# Patient Record
Sex: MALE | Race: WHITE | Employment: OTHER | URBAN - METROPOLITAN AREA
[De-identification: names, ages, dates, MRNs, and addresses within clinical notes are randomized per-mention and may not be internally consistent; named-entity substitution may affect disease eponyms.]

---

## 2018-12-21 ENCOUNTER — HOSPITAL ENCOUNTER (OUTPATIENT)
Dept: CARDIOLOGY | Age: 73
Discharge: HOME | End: 2018-12-21
Payer: MEDICARE

## 2018-12-21 VITALS
HEART RATE: 69 BPM | WEIGHT: 135 LBS | SYSTOLIC BLOOD PRESSURE: 158 MMHG | BODY MASS INDEX: 20.46 KG/M2 | HEIGHT: 68 IN | DIASTOLIC BLOOD PRESSURE: 80 MMHG

## 2018-12-21 DIAGNOSIS — R07.9 CHEST PAIN, UNSPECIFIED TYPE: Primary | ICD-10-CM

## 2018-12-21 LAB
LV EF: 76 %
LVEF MODALITY: NORMAL

## 2018-12-21 PROCEDURE — 3430000000 HC RX DIAGNOSTIC RADIOPHARMACEUTICAL: Performed by: INTERNAL MEDICINE

## 2018-12-21 PROCEDURE — 2580000003 HC RX 258: Performed by: INTERNAL MEDICINE

## 2018-12-21 PROCEDURE — 93017 CV STRESS TEST TRACING ONLY: CPT

## 2018-12-21 PROCEDURE — A9500 TC99M SESTAMIBI: HCPCS | Performed by: INTERNAL MEDICINE

## 2018-12-21 PROCEDURE — 78452 HT MUSCLE IMAGE SPECT MULT: CPT

## 2018-12-21 RX ORDER — SODIUM CHLORIDE 0.9 % (FLUSH) 0.9 %
10 SYRINGE (ML) INJECTION PRN
Status: DISCONTINUED | OUTPATIENT
Start: 2018-12-21 | End: 2018-12-22 | Stop reason: HOSPADM

## 2018-12-21 RX ORDER — VITAMIN B COMPLEX
1 CAPSULE ORAL EVERY OTHER DAY
COMMUNITY
End: 2022-01-18

## 2018-12-21 RX ADMIN — Medication 10.7 MILLICURIE: at 08:08

## 2018-12-21 RX ADMIN — Medication 32.9 MILLICURIE: at 09:22

## 2018-12-21 RX ADMIN — Medication 10 ML: at 08:08

## 2018-12-21 RX ADMIN — Medication 10 ML: at 09:22

## 2019-03-07 ENCOUNTER — HOSPITAL ENCOUNTER (OUTPATIENT)
Dept: GENERAL RADIOLOGY | Age: 74
Discharge: HOME | End: 2019-03-09
Payer: MEDICARE

## 2019-03-07 ENCOUNTER — HOSPITAL ENCOUNTER (OUTPATIENT)
Age: 74
Discharge: HOME | End: 2019-03-09
Payer: MEDICARE

## 2019-03-07 DIAGNOSIS — R05.9 COUGH: ICD-10-CM

## 2019-03-07 PROCEDURE — 71046 X-RAY EXAM CHEST 2 VIEWS: CPT

## 2019-12-18 ENCOUNTER — HOSPITAL ENCOUNTER (OUTPATIENT)
Age: 74
Discharge: HOME | End: 2019-12-20
Payer: MEDICARE

## 2019-12-18 ENCOUNTER — HOSPITAL ENCOUNTER (OUTPATIENT)
Dept: GENERAL RADIOLOGY | Age: 74
Discharge: HOME | End: 2019-12-20
Payer: MEDICARE

## 2019-12-18 DIAGNOSIS — R07.81 PLEURODYNIA: ICD-10-CM

## 2019-12-18 DIAGNOSIS — R07.9 CHEST PAIN, UNSPECIFIED TYPE: ICD-10-CM

## 2019-12-18 PROCEDURE — 71100 X-RAY EXAM RIBS UNI 2 VIEWS: CPT

## 2019-12-18 PROCEDURE — 71046 X-RAY EXAM CHEST 2 VIEWS: CPT

## 2020-06-26 ENCOUNTER — HOSPITAL ENCOUNTER (OUTPATIENT)
Dept: GENERAL RADIOLOGY | Age: 75
Discharge: HOME | End: 2020-06-28
Payer: MEDICARE

## 2020-06-26 ENCOUNTER — HOSPITAL ENCOUNTER (OUTPATIENT)
Age: 75
Discharge: HOME | End: 2020-06-28
Payer: MEDICARE

## 2020-06-26 PROCEDURE — 73630 X-RAY EXAM OF FOOT: CPT

## 2020-07-13 ENCOUNTER — HOSPITAL ENCOUNTER (OUTPATIENT)
Dept: GENERAL RADIOLOGY | Age: 75
Discharge: HOME | End: 2020-07-15
Payer: MEDICARE

## 2020-07-13 ENCOUNTER — HOSPITAL ENCOUNTER (OUTPATIENT)
Age: 75
Discharge: HOME | End: 2020-07-15
Payer: MEDICARE

## 2020-07-13 PROCEDURE — 73630 X-RAY EXAM OF FOOT: CPT

## 2020-09-29 ENCOUNTER — OFFICE VISIT (OUTPATIENT)
Dept: ORTHOPEDIC SURGERY | Age: 75
End: 2020-09-29
Payer: MEDICARE

## 2020-09-29 VITALS — BODY MASS INDEX: 19.7 KG/M2 | TEMPERATURE: 97.2 F | WEIGHT: 130 LBS | HEIGHT: 68 IN

## 2020-09-29 PROCEDURE — 4040F PNEUMOC VAC/ADMIN/RCVD: CPT | Performed by: ORTHOPAEDIC SURGERY

## 2020-09-29 PROCEDURE — G8427 DOCREV CUR MEDS BY ELIG CLIN: HCPCS | Performed by: ORTHOPAEDIC SURGERY

## 2020-09-29 PROCEDURE — 1123F ACP DISCUSS/DSCN MKR DOCD: CPT | Performed by: ORTHOPAEDIC SURGERY

## 2020-09-29 PROCEDURE — 99202 OFFICE O/P NEW SF 15 MIN: CPT | Performed by: ORTHOPAEDIC SURGERY

## 2020-09-29 PROCEDURE — 3017F COLORECTAL CA SCREEN DOC REV: CPT | Performed by: ORTHOPAEDIC SURGERY

## 2020-09-29 PROCEDURE — G8420 CALC BMI NORM PARAMETERS: HCPCS | Performed by: ORTHOPAEDIC SURGERY

## 2020-09-29 PROCEDURE — 1036F TOBACCO NON-USER: CPT | Performed by: ORTHOPAEDIC SURGERY

## 2020-09-29 NOTE — PROGRESS NOTES
Chief Complaint:   Chief Complaint   Patient presents with    Foot Injury     Pain top of left foot off and on x 3 months. Injured stepping on a pickle ball 6/25/2020. Xrays taken at [address] [facility] showing fracture base of left 5th metatarsal.  Had been wearing a post op shoe until two weeks ago. ASHLEIGH Rivera is a 77 y.o. male, who presents with left foot pain episodically for the past few months, initial onset back in July following a twisting injury while he was playing pickle ball, pain at that time was limiting activities including weightbearing, he had x-rays showing a tiny avulsion chip fracture off the base of the fifth metatarsal, he managed it with a wooden postop shoe pain got better so he canceled his visit here, pain then recurred with activities but has now subsided. He has been playing some sports with mild but not limiting discomfort. No previous problems with this foot no other joint complaints. Taking nothing for pain at this time. Allergies; medications; past medical, surgical, family, and social history; and problem list have been reviewed today and updated as indicated in this encounter - see below following Ortho specifics. Musculoskeletal: Healthy-appearing well-developed 77 old male, upper extremities grossly intact, leg lengths equal hip motion painless, knees are straight and stable without laxity deformity or effusion. Right foot unremarkable, left foot shows minimal swelling and mild tenderness to palpation at the very proximal base of the left fifth metatarsal, there is no erythema or effusion, ankle and malleolar structures are completely normal no effusion or tenderness. Radiologic Studies: Initial and follow-up x-rays are reviewed in epic, again noting a tiny evulsion fracture at the base of the fifth metatarsal nondisplaced no evident involvement of the metaphysis or diaphysis.     ASSESSMENT/PLAN:    Iris Dalton was seen today for foot injury. Diagnoses and all orders for this visit:    Pain in joint, foot, left    Closed displaced fracture of fifth metatarsal bone of left foot, initial encounter       Patient was reassured and should expect further resolution of symptoms, he may moderate his activities to tolerance in the meantime, questions asked and answered follow-up as needed. Return if symptoms worsen or fail to improve. Katelynn Garcia MD    2020  3:49 PM      There is no problem list on file for this patient. Past Medical History:   Diagnosis Date    Cancer [facility])     skin areas benign       Past Surgical History:   Procedure Laterality Date    TONSILLECTOMY         Current Outpatient Medications   Medication Sig Dispense Refill    vitamin D (CHOLECALCIFEROL) 1000 UNIT TABS tablet Take 1,000 Units by mouth every other day       b complex vitamins capsule Take 1 capsule by mouth every other day        No current facility-administered medications for this visit. No Known Allergies    Social History     Socioeconomic History    Marital status:      Spouse name: None    Number of children: None    Years of education: None    Highest education level: None   Occupational History    None   Social Needs    Financial resource strain: None    Food insecurity     Worry: None     Inability: None    Transportation needs     Medical: None     Non-medical: None   Tobacco Use    Smoking status: Former Smoker     Types: Cigarettes     Last attempt to quit: 1967     Years since quittin.8    Smokeless tobacco: Never Used   Substance and Sexual Activity    Alcohol use:  Yes     Alcohol/week: 2.0 standard drinks     Types: 2 Cans of beer per week     Comment: weekly     Drug use: Never    Sexual activity: None   Lifestyle    Physical activity     Days per week: None     Minutes per session: None    Stress: None   Relationships    Social connections     Talks on phone: None     Gets together: None     Attends Tenriism service: None     Active member of club or organization: None     Attends meetings of clubs or organizations: None     Relationship status: None    Intimate partner violence     Fear of current or ex partner: None     Emotionally abused: None     Physically abused: None     Forced sexual activity: None   Other Topics Concern    None   Social History Narrative    None       Family History   Problem Relation Age of Onset    Other Mother         old age   Guru Navarrete Heart Attack Father 61    Other Sister         thyroid         Review of Systems  As follows except as previously noted in HPI:  Constitutional: Negative for chills, diaphoresis, fatigue, fever and unexpected weight change. Respiratory: Negative for cough, shortness of breath and wheezing. Cardiovascular: Negative for chest pain and palpitations. Neurological: Negative for dizziness, syncope, cephalgia. GI / : negative  Musculoskeletal: see HPI       Objective:   Physical Exam   Constitutional: Oriented to person, place, and time. and appears well-developed and well-nourished. :   Head: Normocephalic and atraumatic. Eyes: EOM are normal.   Neck: Neck supple. Cardiovascular: Normal rate and regular rhythm. Pulmonary/Chest: Effort normal. No stridor. No respiratory distress, no wheezes. Abdominal:  No abnormal distension. Neurological: Alert and oriented to person, place, and time. Skin: Skin is warm and dry. Psychiatric: Normal mood and affect.  Behavior is normal. Thought content normal.

## 2021-10-15 ENCOUNTER — APPOINTMENT (OUTPATIENT)
Dept: GENERAL RADIOLOGY | Age: 76
DRG: 310 | End: 2021-10-15
Payer: MEDICARE

## 2021-10-15 ENCOUNTER — APPOINTMENT (OUTPATIENT)
Dept: NON INVASIVE DIAGNOSTICS | Age: 76
DRG: 310 | End: 2021-10-15
Payer: MEDICARE

## 2021-10-15 ENCOUNTER — ANESTHESIA EVENT (OUTPATIENT)
Dept: NON INVASIVE DIAGNOSTICS | Age: 76
DRG: 310 | End: 2021-10-15
Payer: MEDICARE

## 2021-10-15 ENCOUNTER — ANESTHESIA (OUTPATIENT)
Dept: NON INVASIVE DIAGNOSTICS | Age: 76
DRG: 310 | End: 2021-10-15
Payer: MEDICARE

## 2021-10-15 ENCOUNTER — HOSPITAL ENCOUNTER (INPATIENT)
Age: 76
LOS: 1 days | Discharge: HOME | DRG: 310 | End: 2021-10-16
Attending: EMERGENCY MEDICINE | Admitting: INTERNAL MEDICINE
Payer: MEDICARE

## 2021-10-15 VITALS
OXYGEN SATURATION: 100 % | DIASTOLIC BLOOD PRESSURE: 71 MMHG | SYSTOLIC BLOOD PRESSURE: 114 MMHG | RESPIRATION RATE: 17 BRPM

## 2021-10-15 DIAGNOSIS — R00.0 WIDE-COMPLEX TACHYCARDIA: Primary | ICD-10-CM

## 2021-10-15 DIAGNOSIS — Z86.79 HISTORY OF WOLFF-PARKINSON-WHITE (WPW) SYNDROME: ICD-10-CM

## 2021-10-15 PROBLEM — I47.1 SVT (SUPRAVENTRICULAR TACHYCARDIA) (HCC): Status: ACTIVE | Noted: 2021-10-15

## 2021-10-15 PROBLEM — I47.10 SVT (SUPRAVENTRICULAR TACHYCARDIA): Status: ACTIVE | Noted: 2021-10-15

## 2021-10-15 LAB
ALBUMIN SERPL-MCNC: 4.7 G/DL (ref 3.5–5.2)
ALP BLD-CCNC: 88 U/L (ref 40–129)
ALT SERPL-CCNC: 12 U/L (ref 0–40)
ANION GAP SERPL CALCULATED.3IONS-SCNC: 12 MMOL/L (ref 7–16)
APTT: 29.2 SEC (ref 24.5–35.1)
AST SERPL-CCNC: 22 U/L (ref 0–39)
BASOPHILS ABSOLUTE: 0.06 E9/L (ref 0–0.2)
BASOPHILS RELATIVE PERCENT: 0.6 % (ref 0–2)
BILIRUB SERPL-MCNC: 0.7 MG/DL (ref 0–1.2)
BUN BLDV-MCNC: 17 MG/DL (ref 6–23)
CALCIUM SERPL-MCNC: 9.6 MG/DL (ref 8.6–10.2)
CHLORIDE BLD-SCNC: 100 MMOL/L (ref 98–107)
CHOLESTEROL, TOTAL: 189 MG/DL (ref 0–199)
CO2: 26 MMOL/L (ref 22–29)
CREAT SERPL-MCNC: 1.1 MG/DL (ref 0.7–1.2)
EKG ATRIAL RATE: 92 BPM
EKG Q-T INTERVAL: 346 MS
EKG QRS DURATION: 128 MS
EKG QTC CALCULATION (BAZETT): 526 MS
EKG R AXIS: -45 DEGREES
EKG T AXIS: 106 DEGREES
EKG VENTRICULAR RATE: 139 BPM
EOSINOPHILS ABSOLUTE: 0.03 E9/L (ref 0.05–0.5)
EOSINOPHILS RELATIVE PERCENT: 0.3 % (ref 0–6)
GFR AFRICAN AMERICAN: >60
GFR NON-AFRICAN AMERICAN: >60 ML/MIN/1.73
GLUCOSE BLD-MCNC: 107 MG/DL (ref 74–99)
HBA1C MFR BLD: 5 % (ref 4–5.6)
HCT VFR BLD CALC: 48.4 % (ref 37–54)
HDLC SERPL-MCNC: 68 MG/DL
HEMOGLOBIN: 15.7 G/DL (ref 12.5–16.5)
IMMATURE GRANULOCYTES #: 0.02 E9/L
IMMATURE GRANULOCYTES %: 0.2 % (ref 0–5)
LDL CHOLESTEROL CALCULATED: 108 MG/DL (ref 0–99)
LV EF: 53 %
LVEF MODALITY: NORMAL
LYMPHOCYTES ABSOLUTE: 1.71 E9/L (ref 1.5–4)
LYMPHOCYTES RELATIVE PERCENT: 17 % (ref 20–42)
MAGNESIUM: 2.2 MG/DL (ref 1.6–2.6)
MCH RBC QN AUTO: 29 PG (ref 26–35)
MCHC RBC AUTO-ENTMCNC: 32.4 % (ref 32–34.5)
MCV RBC AUTO: 89.5 FL (ref 80–99.9)
MONOCYTES ABSOLUTE: 0.75 E9/L (ref 0.1–0.95)
MONOCYTES RELATIVE PERCENT: 7.5 % (ref 2–12)
NEUTROPHILS ABSOLUTE: 7.49 E9/L (ref 1.8–7.3)
NEUTROPHILS RELATIVE PERCENT: 74.4 % (ref 43–80)
PDW BLD-RTO: 13.2 FL (ref 11.5–15)
PLATELET # BLD: 290 E9/L (ref 130–450)
PMV BLD AUTO: 10 FL (ref 7–12)
POTASSIUM SERPL-SCNC: 4.9 MMOL/L (ref 3.5–5)
PRO-BNP: 5716 PG/ML (ref 0–450)
RBC # BLD: 5.41 E12/L (ref 3.8–5.8)
SODIUM BLD-SCNC: 138 MMOL/L (ref 132–146)
TOTAL PROTEIN: 7.8 G/DL (ref 6.4–8.3)
TRIGL SERPL-MCNC: 64 MG/DL (ref 0–149)
TROPONIN, HIGH SENSITIVITY: 43 NG/L (ref 0–11)
TSH SERPL DL<=0.05 MIU/L-ACNC: 1.49 UIU/ML (ref 0.27–4.2)
VLDLC SERPL CALC-MCNC: 13 MG/DL
WBC # BLD: 10.1 E9/L (ref 4.5–11.5)

## 2021-10-15 PROCEDURE — 2060000000 HC ICU INTERMEDIATE R&B

## 2021-10-15 PROCEDURE — 2580000003 HC RX 258: Performed by: NURSE ANESTHETIST, CERTIFIED REGISTERED

## 2021-10-15 PROCEDURE — 3700000001 HC ADD 15 MINUTES (ANESTHESIA)

## 2021-10-15 PROCEDURE — 85025 COMPLETE CBC W/AUTO DIFF WBC: CPT

## 2021-10-15 PROCEDURE — 93005 ELECTROCARDIOGRAM TRACING: CPT | Performed by: PHYSICIAN ASSISTANT

## 2021-10-15 PROCEDURE — 99284 EMERGENCY DEPT VISIT MOD MDM: CPT

## 2021-10-15 PROCEDURE — 2580000003 HC RX 258: Performed by: FAMILY MEDICINE

## 2021-10-15 PROCEDURE — 93312 ECHO TRANSESOPHAGEAL: CPT

## 2021-10-15 PROCEDURE — 84484 ASSAY OF TROPONIN QUANT: CPT

## 2021-10-15 PROCEDURE — 6360000002 HC RX W HCPCS: Performed by: NURSE ANESTHETIST, CERTIFIED REGISTERED

## 2021-10-15 PROCEDURE — 6370000000 HC RX 637 (ALT 250 FOR IP): Performed by: INTERNAL MEDICINE

## 2021-10-15 PROCEDURE — 93005 ELECTROCARDIOGRAM TRACING: CPT | Performed by: INTERNAL MEDICINE

## 2021-10-15 PROCEDURE — 6360000002 HC RX W HCPCS: Performed by: EMERGENCY MEDICINE

## 2021-10-15 PROCEDURE — 93320 DOPPLER ECHO COMPLETE: CPT

## 2021-10-15 PROCEDURE — 7100000011 HC PHASE II RECOVERY - ADDTL 15 MIN

## 2021-10-15 PROCEDURE — 7100000010 HC PHASE II RECOVERY - FIRST 15 MIN

## 2021-10-15 PROCEDURE — 83036 HEMOGLOBIN GLYCOSYLATED A1C: CPT

## 2021-10-15 PROCEDURE — 99222 1ST HOSP IP/OBS MODERATE 55: CPT | Performed by: INTERNAL MEDICINE

## 2021-10-15 PROCEDURE — 80053 COMPREHEN METABOLIC PANEL: CPT

## 2021-10-15 PROCEDURE — 3700000000 HC ANESTHESIA ATTENDED CARE

## 2021-10-15 PROCEDURE — 83880 ASSAY OF NATRIURETIC PEPTIDE: CPT

## 2021-10-15 PROCEDURE — 80061 LIPID PANEL: CPT

## 2021-10-15 PROCEDURE — 93325 DOPPLER ECHO COLOR FLOW MAPG: CPT

## 2021-10-15 PROCEDURE — APPSS60 APP SPLIT SHARED TIME 46-60 MINUTES: Performed by: NURSE PRACTITIONER

## 2021-10-15 PROCEDURE — 85730 THROMBOPLASTIN TIME PARTIAL: CPT

## 2021-10-15 PROCEDURE — 5A2204Z RESTORATION OF CARDIAC RHYTHM, SINGLE: ICD-10-PCS | Performed by: INTERNAL MEDICINE

## 2021-10-15 PROCEDURE — 83735 ASSAY OF MAGNESIUM: CPT

## 2021-10-15 PROCEDURE — 96375 TX/PRO/DX INJ NEW DRUG ADDON: CPT

## 2021-10-15 PROCEDURE — 2580000003 HC RX 258: Performed by: EMERGENCY MEDICINE

## 2021-10-15 PROCEDURE — 92960 CARDIOVERSION ELECTRIC EXT: CPT | Performed by: INTERNAL MEDICINE

## 2021-10-15 PROCEDURE — 96365 THER/PROPH/DIAG IV INF INIT: CPT

## 2021-10-15 PROCEDURE — 84443 ASSAY THYROID STIM HORMONE: CPT

## 2021-10-15 PROCEDURE — 6360000002 HC RX W HCPCS: Performed by: STUDENT IN AN ORGANIZED HEALTH CARE EDUCATION/TRAINING PROGRAM

## 2021-10-15 PROCEDURE — 71045 X-RAY EXAM CHEST 1 VIEW: CPT

## 2021-10-15 RX ORDER — SODIUM CHLORIDE 0.9 % (FLUSH) 0.9 %
10 SYRINGE (ML) INJECTION PRN
Status: DISCONTINUED | OUTPATIENT
Start: 2021-10-15 | End: 2021-10-16 | Stop reason: HOSPADM

## 2021-10-15 RX ORDER — MAGNESIUM SULFATE IN WATER 40 MG/ML
2000 INJECTION, SOLUTION INTRAVENOUS ONCE
Status: COMPLETED | OUTPATIENT
Start: 2021-10-15 | End: 2021-10-15

## 2021-10-15 RX ORDER — POTASSIUM CHLORIDE 20 MEQ/1
40 TABLET, EXTENDED RELEASE ORAL PRN
Status: DISCONTINUED | OUTPATIENT
Start: 2021-10-15 | End: 2021-10-16 | Stop reason: HOSPADM

## 2021-10-15 RX ORDER — HEPARIN SODIUM 10000 [USP'U]/100ML
5-30 INJECTION, SOLUTION INTRAVENOUS CONTINUOUS
Status: DISCONTINUED | OUTPATIENT
Start: 2021-10-15 | End: 2021-10-15

## 2021-10-15 RX ORDER — ACETAMINOPHEN 325 MG/1
650 TABLET ORAL EVERY 6 HOURS PRN
Status: DISCONTINUED | OUTPATIENT
Start: 2021-10-15 | End: 2021-10-16 | Stop reason: HOSPADM

## 2021-10-15 RX ORDER — PROPOFOL 10 MG/ML
INJECTION, EMULSION INTRAVENOUS PRN
Status: DISCONTINUED | OUTPATIENT
Start: 2021-10-15 | End: 2021-10-15 | Stop reason: SDUPTHER

## 2021-10-15 RX ORDER — POTASSIUM CHLORIDE 7.45 MG/ML
10 INJECTION INTRAVENOUS PRN
Status: DISCONTINUED | OUTPATIENT
Start: 2021-10-15 | End: 2021-10-16 | Stop reason: HOSPADM

## 2021-10-15 RX ORDER — SODIUM CHLORIDE 0.9 % (FLUSH) 0.9 %
SYRINGE (ML) INJECTION
Status: DISPENSED
Start: 2021-10-15 | End: 2021-10-16

## 2021-10-15 RX ORDER — ACETAMINOPHEN 650 MG/1
650 SUPPOSITORY RECTAL EVERY 6 HOURS PRN
Status: DISCONTINUED | OUTPATIENT
Start: 2021-10-15 | End: 2021-10-16 | Stop reason: HOSPADM

## 2021-10-15 RX ORDER — VITAMIN B COMPLEX
1000 TABLET ORAL EVERY OTHER DAY
Status: DISCONTINUED | OUTPATIENT
Start: 2021-10-15 | End: 2021-10-16 | Stop reason: HOSPADM

## 2021-10-15 RX ORDER — FLECAINIDE ACETATE 50 MG/1
50 TABLET ORAL EVERY 12 HOURS SCHEDULED
Status: DISCONTINUED | OUTPATIENT
Start: 2021-10-15 | End: 2021-10-16 | Stop reason: HOSPADM

## 2021-10-15 RX ORDER — SODIUM CHLORIDE 9 MG/ML
INJECTION, SOLUTION INTRAVENOUS CONTINUOUS PRN
Status: DISCONTINUED | OUTPATIENT
Start: 2021-10-15 | End: 2021-10-15 | Stop reason: SDUPTHER

## 2021-10-15 RX ORDER — SODIUM CHLORIDE 9 MG/ML
25 INJECTION, SOLUTION INTRAVENOUS PRN
Status: DISCONTINUED | OUTPATIENT
Start: 2021-10-15 | End: 2021-10-16 | Stop reason: HOSPADM

## 2021-10-15 RX ORDER — HEPARIN SODIUM 1000 [USP'U]/ML
60 INJECTION, SOLUTION INTRAVENOUS; SUBCUTANEOUS PRN
Status: DISCONTINUED | OUTPATIENT
Start: 2021-10-15 | End: 2021-10-15

## 2021-10-15 RX ORDER — POLYETHYLENE GLYCOL 3350 17 G/17G
17 POWDER, FOR SOLUTION ORAL DAILY PRN
Status: DISCONTINUED | OUTPATIENT
Start: 2021-10-15 | End: 2021-10-16 | Stop reason: HOSPADM

## 2021-10-15 RX ORDER — 0.9 % SODIUM CHLORIDE 0.9 %
1000 INTRAVENOUS SOLUTION INTRAVENOUS ONCE
Status: COMPLETED | OUTPATIENT
Start: 2021-10-15 | End: 2021-10-15

## 2021-10-15 RX ORDER — VITAMIN C
1 TAB ORAL EVERY OTHER DAY
Status: DISCONTINUED | OUTPATIENT
Start: 2021-10-15 | End: 2021-10-16 | Stop reason: HOSPADM

## 2021-10-15 RX ORDER — HEPARIN SODIUM 1000 [USP'U]/ML
30 INJECTION, SOLUTION INTRAVENOUS; SUBCUTANEOUS PRN
Status: DISCONTINUED | OUTPATIENT
Start: 2021-10-15 | End: 2021-10-15

## 2021-10-15 RX ORDER — HEPARIN SODIUM 1000 [USP'U]/ML
60 INJECTION, SOLUTION INTRAVENOUS; SUBCUTANEOUS ONCE
Status: COMPLETED | OUTPATIENT
Start: 2021-10-15 | End: 2021-10-15

## 2021-10-15 RX ORDER — ONDANSETRON 2 MG/ML
4 INJECTION INTRAMUSCULAR; INTRAVENOUS EVERY 6 HOURS PRN
Status: DISCONTINUED | OUTPATIENT
Start: 2021-10-15 | End: 2021-10-15

## 2021-10-15 RX ORDER — ONDANSETRON 4 MG/1
4 TABLET, ORALLY DISINTEGRATING ORAL EVERY 8 HOURS PRN
Status: DISCONTINUED | OUTPATIENT
Start: 2021-10-15 | End: 2021-10-15

## 2021-10-15 RX ORDER — SODIUM CHLORIDE 0.9 % (FLUSH) 0.9 %
5-40 SYRINGE (ML) INJECTION EVERY 12 HOURS SCHEDULED
Status: DISCONTINUED | OUTPATIENT
Start: 2021-10-15 | End: 2021-10-16 | Stop reason: HOSPADM

## 2021-10-15 RX ADMIN — MAGNESIUM SULFATE HEPTAHYDRATE 2000 MG: 40 INJECTION, SOLUTION INTRAVENOUS at 13:01

## 2021-10-15 RX ADMIN — APIXABAN 5 MG: 5 TABLET, FILM COATED ORAL at 22:04

## 2021-10-15 RX ADMIN — FLECAINIDE ACETATE 50 MG: 50 TABLET ORAL at 19:04

## 2021-10-15 RX ADMIN — SODIUM CHLORIDE: 9 INJECTION, SOLUTION INTRAVENOUS at 14:58

## 2021-10-15 RX ADMIN — SODIUM CHLORIDE, PRESERVATIVE FREE 10 ML: 5 INJECTION INTRAVENOUS at 22:04

## 2021-10-15 RX ADMIN — HEPARIN SODIUM 3540 UNITS: 1000 INJECTION, SOLUTION INTRAVENOUS; SUBCUTANEOUS at 13:32

## 2021-10-15 RX ADMIN — HEPARIN SODIUM 12 UNITS/KG/HR: 10000 INJECTION, SOLUTION INTRAVENOUS at 13:36

## 2021-10-15 RX ADMIN — SODIUM CHLORIDE 1000 ML: 9 INJECTION, SOLUTION INTRAVENOUS at 13:08

## 2021-10-15 RX ADMIN — PROPOFOL 210 MG: 10 INJECTION, EMULSION INTRAVENOUS at 15:02

## 2021-10-15 ASSESSMENT — ENCOUNTER SYMPTOMS
CONSTIPATION: 0
COUGH: 0
BACK PAIN: 0
DIARRHEA: 0
EYE REDNESS: 0
APNEA: 0
SORE THROAT: 0
CHEST TIGHTNESS: 0
WHEEZING: 0
EYE PAIN: 0
VOMITING: 0
PHOTOPHOBIA: 0
NAUSEA: 0
SHORTNESS OF BREATH: 0
ABDOMINAL PAIN: 0
TROUBLE SWALLOWING: 0
RHINORRHEA: 0

## 2021-10-15 ASSESSMENT — PAIN SCALES - GENERAL: PAINLEVEL_OUTOF10: 0

## 2021-10-15 NOTE — ED PROVIDER NOTES
[address]      Pt Name: Batsheva Leach  MRN: [id number]  [place] 1945  Date of evaluation: 10/15/2021      CHIEF COMPLAINT       Chief Complaint   Patient presents with    Tachycardia     140s, sent in by pcp, hx WPW syndrome, can feel palpitations / denies cp or sob    Fatigue        HPI  Batsheva Leach is a 77 y.o. 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 Patient states that for the last day or so he has felt he has had palpitations and constant tachycardia. States that he was told 10 years ago or so that he had more Parkinson's white. At that time he was offered ablation but declined twice at the [facility]. He states that over the last 10 years he will intermittently have tachycardia that last several minutes up to an hour but always resolves on its own. He is not any medications for his WPW. He describes symptoms as moderate, constant, nothing makes them better or worse. He denies any chest pain, shortness of breath, abdominal pain, lightheadedness or syncope. He is not on any medications at home. He is not on any blood thinners. Review of Systems   Constitutional: Negative for activity change, chills, diaphoresis, fatigue and fever. HENT: Negative for rhinorrhea, sore throat and trouble swallowing. Eyes: Negative for photophobia, pain and redness. Respiratory: Negative for apnea, cough, chest tightness, shortness of breath and wheezing. Cardiovascular: Positive for palpitations. Negative for chest pain and leg swelling. Gastrointestinal: Negative for abdominal pain, constipation, diarrhea, nausea and vomiting. Endocrine: Negative for polyuria. Genitourinary: Negative for difficulty urinating and dysuria. Musculoskeletal: Negative for back pain, neck pain and neck stiffness. Skin: Negative for pallor and rash.    Neurological: Negative for department heart rate around 150 bpm.  EKG showed showed a wide-complex tachycardia. Given history WPW concern for possible reentry tachycardia. Cardiology emergently consulted saw the patient in the ED. Patient hemodynamic stable otherwise asymptomatic besides feeling palpitations with normal blood pressure. Administer magnesium IV. No procainamide in the hospital.  Patient started on heparin. Patient taken to the OR for cardioversion status post GEORGE by cardiology. Metabolic panel showed normal electrolytes normal renal function. BNP elevated. Troponin mildly elevated likely secondary to stress. No active chest pain. No ST segment elevation. Patient admitted to Dr. Gaurav Baum after discussion with NP.              --------------------------------------------- PAST HISTORY ---------------------------------------------  Past Medical History:  has a past medical history of Cancer ([facility] [address] Past Surgical History:  has a past surgical history that includes Tonsillectomy. Social History:  reports that he quit smoking about 53 years ago. His smoking use included cigarettes. He has never used smokeless tobacco. He reports current alcohol use of about 2.0 standard drinks of alcohol per week. He reports that he does not use drugs. Family History: family history includes Heart Attack (age of onset: 61) in his father; Other in his mother and sister. The patients home medications have been reviewed. Allergies: Patient has no known allergies.     -------------------------------------------------- RESULTS -------------------------------------------------    LABS:  Results for orders placed or performed during the hospital encounter of 10/15/21   CBC auto differential   Result Value Ref Range    WBC 10.1 4.5 - 11.5 E9/L    RBC 5.41 3.80 - 5.80 E12/L    Hemoglobin 15.7 12.5 - 16.5 g/dL    Hematocrit 48.4 37.0 - 54.0 %    MCV 89.5 80.0 - 99.9 fL    MCH 29.0 26.0 - 35.0 pg    MCHC 32.4 32.0 - 34.5 %    RDW 13.2 11.5 - 15.0 fL    Platelets 668 518 - 191 E9/L    MPV 10.0 7.0 - 12.0 fL    Neutrophils % 74.4 43.0 - 80.0 %    Immature Granulocytes % 0.2 0.0 - 5.0 %    Lymphocytes % 17.0 (L) 20.0 - 42.0 %    Monocytes % 7.5 2.0 - 12.0 %    Eosinophils % 0.3 0.0 - 6.0 %    Basophils % 0.6 0.0 - 2.0 %    Neutrophils Absolute 7.49 (H) 1.80 - 7.30 E9/L    Immature Granulocytes # 0.02 E9/L    Lymphocytes Absolute 1.71 1.50 - 4.00 E9/L    Monocytes Absolute 0.75 0.10 - 0.95 E9/L    Eosinophils Absolute 0.03 (L) 0.05 - 0.50 E9/L    Basophils Absolute 0.06 0.00 - 0.20 E9/L   Comprehensive Metabolic Panel   Result Value Ref Range    Sodium 138 132 - 146 mmol/L    Potassium 4.9 3.5 - 5.0 mmol/L    Chloride 100 98 - 107 mmol/L    CO2 26 22 - 29 mmol/L    Anion Gap 12 7 - 16 mmol/L    Glucose 107 (H) 74 - 99 mg/dL    BUN 17 6 - 23 mg/dL    CREATININE 1.1 0.7 - 1.2 mg/dL    GFR Non-African American >60 >=60 mL/min/1.73    GFR African American >60     Calcium 9.6 8.6 - 10.2 mg/dL    Total Protein 7.8 6.4 - 8.3 g/dL    Albumin 4.7 3.5 - 5.2 g/dL    Total Bilirubin 0.7 0.0 - 1.2 mg/dL    Alkaline Phosphatase 88 40 - 129 U/L    ALT 12 0 - 40 U/L    AST 22 0 - 39 U/L   Troponin   Result Value Ref Range    Troponin, High Sensitivity 43 (H) 0 - 11 ng/L   Magnesium   Result Value Ref Range    Magnesium 2.2 1.6 - 2.6 mg/dL   TSH without Reflex   Result Value Ref Range    TSH 1.490 0.270 - 4.200 uIU/mL   Brain Natriuretic Peptide   Result Value Ref Range    Pro-BNP 5,716 (H) 0 - 450 pg/mL   APTT   Result Value Ref Range    aPTT 29.2 24.5 - 35.1 sec   Hemoglobin A1C   Result Value Ref Range    Hemoglobin A1C 5.0 4.0 - 5.6 %   Lipid Panel   Result Value Ref Range    Cholesterol, Total 189 0 - 199 mg/dL    Triglycerides 64 0 - 149 mg/dL    HDL 68 >40 mg/dL    LDL Calculated 108 (H) 0 - 99 mg/dL    VLDL Cholesterol Calculated 13 mg/dL   Basic Metabolic Panel w/ Reflex to MG   Result Value Ref Range    Sodium 137 132 - 146 mmol/L    Potassium reflex Magnesium 4.4 3.5 - 5.0 mmol/L    Chloride 107 98 - 107 mmol/L    CO2 24 22 - 29 mmol/L    Anion Gap 6 (L) 7 - 16 mmol/L    Glucose 106 (H) 74 - 99 mg/dL    BUN 22 6 - 23 mg/dL    CREATININE 1.0 0.7 - 1.2 mg/dL    GFR Non-African American >60 >=60 mL/min/1.73    GFR African American >60     Calcium 8.6 8.6 - 10.2 mg/dL   CBC   Result Value Ref Range    WBC 7.5 4.5 - 11.5 E9/L    RBC 4.20 3.80 - 5.80 E12/L    Hemoglobin 12.3 (L) 12.5 - 16.5 g/dL    Hematocrit 38.3 37.0 - 54.0 %    MCV 91.2 80.0 - 99.9 fL    MCH 29.3 26.0 - 35.0 pg    MCHC 32.1 32.0 - 34.5 %    RDW 13.0 11.5 - 15.0 fL    Platelets 584 262 - 199 E9/L    MPV 10.2 7.0 - 12.0 fL   EKG 12 Lead   Result Value Ref Range    Ventricular Rate 143 BPM    Atrial Rate 144 BPM    QRS Duration 130 ms    Q-T Interval 338 ms    QTc Calculation (Bazett) 521 ms    R Axis -51 degrees    T Axis 102 degrees   EKG 12 Lead   Result Value Ref Range    Ventricular Rate 139 BPM    Atrial Rate 92 BPM    QRS Duration 128 ms    Q-T Interval 346 ms    QTc Calculation (Bazett) 526 ms    R Axis -45 degrees    T Axis 106 degrees       RADIOLOGY:  XR CHEST PORTABLE   Final Result   1. Slightly limited exam, grossly negative for acute process, with no   significant change. .             EKG:  This EKG is signed and interpreted by me. Rate: 143bpm  Rhythm: wide qrs tachycardia, svt vs reentrant tachycardia  Interpretation: left axis. Non specific st changes. No st segment elevation. Borderline right bundle branch. Comparison: no previous EKG available      ------------------------- NURSING NOTES AND VITALS REVIEWED ---------------------------  Date / Time Roomed:  10/15/2021 12:43 PM  ED Bed Assignment:  [id number]    The nursing notes within the ED encounter and vital signs as below have been reviewed.      Patient Vitals for the past 24 hrs:   BP Temp Temp src Pulse Resp SpO2 Height Weight   10/16/21 0430 125/72 97.9 °F (36.6 °C) Oral (!) 46 16 97 % -- --   10/15/21 1945 (!) 158/74 97.8 °F (36.6 °C) Oral 63 16 97 % -- --   10/15/21 1553 120/79 -- -- 60 16 100 % -- --   10/15/21 1542 129/74 -- -- 56 14 99 % -- --   10/15/21 1517 -- -- -- 57 13 100 % -- --   10/15/21 1454 (!) 141/101 -- -- 135 18 100 % -- --   10/15/21 1335 (!) 143/99 -- -- 134 -- -- -- --   10/15/21 1330 (!) 147/106 97.8 °F (36.6 °C) Oral 136 16 -- -- --   10/15/21 1311 -- -- -- 150 -- -- -- --   10/15/21 1234 109/82 97.4 °F (36.3 °C) Oral 150 14 97 % 5' 7\" (1.702 m) 130 lb (59 kg)       Oxygen Saturation Interpretation: Normal    ------------------------------------------ PROGRESS NOTES ------------------------------------------    Counseling:  I have spoken with the patient and discussed todays results, in addition to providing specific details for the plan of care and counseling regarding the diagnosis and prognosis. Their questions are answered at this time and they are agreeable with the plan of admission.    --------------------------------- ADDITIONAL PROVIDER NOTES ---------------------------------  Consultations:  Spoke with Lakeshia Mac np working with Dr. Esteban Geren. Discussed case. They will admit the patient. This patient's ED course included: a personal history and physicial examination, re-evaluation prior to disposition, multiple bedside re-evaluations, IV medications, cardiac monitoring and continuous pulse oximetry    This patient has remained hemodynamically stable during their ED course. Diagnosis:  1. Wide-complex tachycardia ([address]    2. History of Taye-Parkinson-White (WPW) syndrome        Disposition:  Patient's disposition: Admit to telemetry  Patient's condition is stable.          Jamir Andersen DO  Resident  10/16/21 2403

## 2021-10-15 NOTE — ANESTHESIA POSTPROCEDURE EVALUATION
Department of Anesthesiology  Postprocedure Note    Patient: Vivian Joseph  MRN: [id number]  YOB: 1945  Date of evaluation: 10/15/2021  Time:  6:06 PM     Procedure Summary     Date: 10/15/21 Room / Location: [facility] Echocardiography    Anesthesia Start: 1458 Anesthesia Stop: 1518    Procedure: TRANSESOPHAGEAL ECHO Diagnosis:     Scheduled Providers:  Responsible Provider: Carmelo Reddy MD    Anesthesia Type: MAC ASA Status: 3 - Emergent          Anesthesia Type: MAC    Moon Phase I: Moon Score: 10    Moon Phase II: Moon Score: 10    Last vitals: Reviewed and per EMR flowsheets.        Anesthesia Post Evaluation    Patient location during evaluation: PACU  Patient participation: complete - patient participated  Level of consciousness: awake and alert  Pain score: 0  Airway patency: patent  Nausea & Vomiting: no vomiting and no nausea  Complications: no  Cardiovascular status: hemodynamically stable  Respiratory status: spontaneous ventilation  Hydration status: stable

## 2021-10-15 NOTE — ANESTHESIA PRE PROCEDURE
Department of Anesthesiology  Preprocedure Note       Name:  Leny Older   Age:  77 y.o.  :  1945                                          MRN:  [id number]         Date:  10/15/2021      Surgeon: * No surgeons listed *    Procedure: * No procedures listed *    Medications prior to admission:   Prior to Admission medications    Medication Sig Start Date End Date Taking?  Authorizing Provider   vitamin D (CHOLECALCIFEROL) 1000 UNIT TABS tablet Take 1,000 Units by mouth every other day     Historical Provider, MD   b complex vitamins capsule Take 1 capsule by mouth every other day     Historical Provider, MD       Current medications:    Current Facility-Administered Medications   Medication Dose Route Frequency Provider Last Rate Last Admin    heparin (porcine) injection 3,540 Units  60 Units/kg IntraVENous PRN [place] Reagan, DO        heparin (porcine) injection 1,770 Units  30 Units/kg IntraVENous PRN [place] Reagan, DO        heparin 25,000 units in dextrose 5% 250 mL (premix) infusion  5-30 Units/kg/hr IntraVENous Continuous [place] Reagan, DO 7.1 mL/hr at 10/15/21 1336 12 Units/kg/hr at 10/15/21 1336     Current Outpatient Medications   Medication Sig Dispense Refill    vitamin D (CHOLECALCIFEROL) 1000 UNIT TABS tablet Take 1,000 Units by mouth every other day       b complex vitamins capsule Take 1 capsule by mouth every other day          Allergies:  No Known Allergies    Problem List:    Patient Active Problem List   Diagnosis Code    Wide-complex tachycardia (HCC) I47.2    SVT (supraventricular tachycardia) (HCC) I47.1       Past Medical History:        Diagnosis Date    Cancer ([facility] [address]     skin areas benign       Past Surgical History:        Procedure Laterality Date    TONSILLECTOMY         Social History:    Social History     Tobacco Use    Smoking status: Former Smoker     Types: Cigarettes     Quit date: 1967     Years since quittin.8    Smokeless tobacco: Never Used Substance Use Topics    Alcohol use: Yes     Alcohol/week: 2.0 standard drinks     Types: 2 Cans of beer per week     Comment: weekly                                 Counseling given: Not Answered      Vital Signs (Current):   Vitals:    10/15/21 1234 10/15/21 1311 10/15/21 1330 10/15/21 1335   BP: 109/82  (!) 147/106 (!) 143/99   Pulse: 150 150 136 134   Resp: 14  16    Temp: 97.4 °F (36.3 °C)  97.8 °F (36.6 °C)    TempSrc: Oral  Oral    SpO2: 97%      Weight: 130 lb (59 kg)      Height: 5' 7\" (1.702 m)                                                 BP Readings from Last 3 Encounters:   10/15/21 (!) 143/99   12/21/18 (!) 158/80       NPO Status:                                                                                 BMI:   Wt Readings from Last 3 Encounters:   10/15/21 130 lb (59 kg)   09/29/20 130 lb (59 kg)   12/21/18 135 lb (61.2 kg)     Body mass index is 20.36 kg/m². CBC:   Lab Results   Component Value Date    WBC 10.1 10/15/2021    RBC 5.41 10/15/2021    HGB 15.7 10/15/2021    HCT 48.4 10/15/2021    MCV 89.5 10/15/2021    RDW 13.2 10/15/2021     10/15/2021       CMP:   Lab Results   Component Value Date     10/15/2021    K 4.9 10/15/2021     10/15/2021    CO2 26 10/15/2021    BUN 17 10/15/2021    CREATININE 1.1 10/15/2021    GFRAA >60 10/15/2021    LABGLOM >60 10/15/2021    GLUCOSE 107 10/15/2021    PROT 7.8 10/15/2021    CALCIUM 9.6 10/15/2021    BILITOT 0.7 10/15/2021    ALKPHOS 88 10/15/2021    AST 22 10/15/2021    ALT 12 10/15/2021       POC Tests: No results for input(s): POCGLU, POCNA, POCK, POCCL, POCBUN, POCHEMO, POCHCT in the last 72 hours.     Coags:   Lab Results   Component Value Date    APTT 29.2 10/15/2021       HCG (If Applicable): No results found for: PREGTESTUR, PREGSERUM, HCG, HCGQUANT     ABGs: No results found for: PHART, PO2ART, YII2QWB, UGE7NHW, BEART, B2AGCKIL     Type & Screen (If Applicable):  No results found for: LABABO, LABRH    Drug/Infectious Status (If Applicable):  No results found for: HIV, HEPCAB    COVID-19 Screening (If Applicable): No results found for: COVID19        Anesthesia Evaluation  Patient summary reviewed and Nursing notes reviewed no history of anesthetic complications:   Airway: Mallampati: III  TM distance: <3 FB   Neck ROM: full  Mouth opening: < 3 FB Dental: normal exam         Pulmonary:Negative Pulmonary ROS and normal exam                               Cardiovascular:  Exercise tolerance: good (>4 METS),   (+) dysrhythmias (WPW): atrial fibrillation and SVT,       ECG reviewed                        Neuro/Psych:   Negative Neuro/Psych ROS              GI/Hepatic/Renal: Neg GI/Hepatic/Renal ROS            Endo/Other: Negative Endo/Other ROS                    Abdominal:             Vascular: negative vascular ROS. Other Findings:             Anesthesia Plan      MAC     ASA 3 - emergent       Induction: intravenous. Anesthetic plan and risks discussed with patient.       Plan discussed with CRNA and surgical team.                  Dionisio Crawford MD   10/15/2021

## 2021-10-15 NOTE — OP NOTE
Operative Note      Patient: Rodrigo Alva  YOB: 1945  MRN: [id number]    Date of Procedure: 10/15/2021    Pre-Op Diagnosis: *SVT with WPW *    Post-Op Diagnosis: SVT with WPW       Cardioversion: Consent for operation or procedure: Risks and benefits discussed with patient and consent obtained      EBL: 0 ml    The appropriate time-out procedure was performed including proper identification of the patient, physician, procedure, documentation, and there were no safety issues identified. The patient participated actively in this. After sedation was achieved, the patient  was placed in the supine position and hands free patches were placed on his chest in the AP position. Pt underwent GEORGE and tolerated procedure well. Full report is provided separately. 1 shock was provided at, 150 Joules with successful restoration of normal sinus rhythm. Repeat EKG confirms return to sinus rhythm with pre-excitation. Complications: The patient tolerated the procedure well without complications.     J Luis Santana MD  Cardiologist  Cardiology, [facility]) Physicians    Electronically signed by J Luis Santana MD on 10/15/2021 at 4:52 PM

## 2021-10-15 NOTE — ED NOTES
FIRST PROVIDER CONTACT ASSESSMENT NOTE                                                                                                Department of Emergency Medicine                                                      First Provider Note  10/15/21  12:36 PM EDT  NAME: Latha Thomson  : 1945  MRN: [id number]    Chief Complaint: Tachycardia (140s, sent in by pcp, hx WPW syndrome, can feel palpitations / denies cp or sob) and Fatigue      History of Present Illness:   Latha Thomson is a 77 y.o. male who presents to the ED for tachycardia. Patient is in the 140s to 160s, patient is in SVT. Patient states he struggled with it for years. Patient states he went to the doctor and is extremely high having palpitations and fatigue. Patient has no chest pain or shortness of breath    Focused Physical Exam:  VS:    ED Triage Vitals   BP Temp Temp src Pulse Resp SpO2 Height Weight   -- -- -- -- -- -- -- --        General: Alert and in no apparent distress. Medical History:  has a past medical history of Cancer ([facility] [address] Surgical History:  has a past surgical history that includes Tonsillectomy. Social History:  reports that he quit smoking about 53 years ago. His smoking use included cigarettes. He has never used smokeless tobacco. He reports current alcohol use of about 2.0 standard drinks of alcohol per week. He reports that he does not use drugs. Family History: family history includes Heart Attack (age of onset: 61) in his father; Other in his mother and sister. Allergies: Patient has no known allergies.      Initial Plan of Care:  Initiate Treatment-Testing, Proceed toTreatment Area When Bed Available for ED Attending/MLP to Continue Care    -------------------------------------------------END OF FIRST PROVIDER CONTACT ASSESSMENT NOTE--------------------------------------------------------  Electronically signed by Prabhakar Sparrow PA-C   DD: 10/15/21       Prabhakar Sparrow PA-C  10/15/21 [address]

## 2021-10-15 NOTE — CONSULTS
Inpatient Cardiology Consultation      Reason for Consult:  WCT. Hx WPW    Consulting Physician: Dr Robert Vázquez    Requesting Physician:  Dr Noe Killian    Date of Consultation: 10/15/2021    HISTORY OF PRESENT ILLNESS: 78 yo  male not previously known to [facility] Cardiology. Diagnosed with WPW about 20 years ago at [facility] ED recommended ablation but declined at that time. At a stress at that time that was \"normal\"    Saw Angeli Mays approximately 4 years ago since he wasn't covered by insurance never followed up--> ablation was recommended at that time. When bending over most times (for several years) feels heart beating fast but it normally goes away in a few seconds. Yesterday around noon playing pickleball jumped up to reach a ball, and felt his  heart racing which continued throughout the rest of the day and he woke up this am with the sensation his heart was racing. Tried to bear down but heart continued to race, reported 's. Able to sleep overnight no SOB, orthopnea, PND, or CP.   10/15/2021 Seen by PCP today--> sent to ED    PMH: WPW    SE-B 10/14/2021 sent in by PCP for tachycardia 's, palpitation s and fatigue. 1 liter NSS and 2 grams IV Magnesium ordered. Labs ordered but not resulted. Please note: past medical records were reviewed per electronic medical record (EMR) - see detailed reports under Past Medical/ Surgical History. Past Medical History:    1. Denies HTN, thyroid dysfunction, CVA, CHF, AF  2. Hx \"WPW\" diagnosed in early 2000's declined ablation at that time  3. Hx HLD placed on statin unsure of name caused constipation  4. Skin carcinoma excision  5. 2017 seen by Dr Yana Metz for WPW recommended ablation but due to insurance reasons did not follow up   6. 12/2018 Exercise MPS: Non ischemic EF 76%. NWM. Duke Treadmill score 9. Leobardo protocol, completing 9:01 minutes and reaching an estimated work load of 82.9 metabolic equivalents (METS). Resting HR was 55.  Peak tingling  · Psychiatric: Denies anxiety or depression. · Endocrine: Denies temperature intolerance. No recent weight change. .  · Hematologic/Lymphatic: Denies abnormal bruising or bleeding. No swollen lymph nodes    PHYSICAL EXAM:   /82   Pulse 150   Temp 97.4 °F (36.3 °C) (Oral)   Resp 14   Ht 5' 7\" (1.702 m)   Wt 130 lb (59 kg)   SpO2 97%   BMI 20.36 kg/m²   CONST:  Well developed, elderly  male who appears of stated age. Awake, alert and cooperative. No apparent distress. HEENT:   Head- Normocephalic, atraumatic   Eyes- Conjunctivae pink, anicteric  Throat- Oral mucosa pink and moist  Neck-  No stridor, trachea midline, no jugular venous distention. No carotid bruit. CHEST: Chest symmetrical and non-tender to palpation. No accessory muscle use or intercostal retractions  RESPIRATORY: Lung sounds - clear throughout fields   CARDIOVASCULAR:     Heart Ausculation- Tachycardic rate and regular rhythm, no murmur. PV: No lower extremity edema. No varicosities. Pedal pulses palpable, no clubbing or cyanosis   ABDOMEN: Soft, non-tender to light palpation. Bowel sounds present. No palpable masses no organomegaly; no abdominal bruit  MS: Good muscle strength and tone. No atrophy or abnormal movements. : Deferred  SKIN: Warm and dry no statis dermatitis or ulcers   NEURO / PSYCH: Oriented to person, place and time. Speech clear and appropriate. Follows all commands. Pleasant affect     DATA:    ECG WCT  Tele strips: WCT rate 130's    Diagnostic:      Labs: Lab work ordered not resulted. ASSESSMENT:  1. SVT versus AFL with pre-excitation  2. Hx WPW, no treatment  3. Hx HLD \"Statin\" caused constipation  4. Hx Skin carcinoma excised    PLAN:  1. Heparin bolus/gtt  2. Await electrolyte panel results  3. Keep magnesium >2.0 and potassium >4.0  4. GEORGE/Cardioversion today  5. Maintain NPO status  6.  Further recommendations to follow    Discussed with Dr Rolando Cordova  Electronically signed by [facility] CHAR[facility] Chung on 10/15/2021 at 12:55 PM

## 2021-10-16 VITALS
SYSTOLIC BLOOD PRESSURE: 118 MMHG | WEIGHT: 132 LBS | BODY MASS INDEX: 20.72 KG/M2 | RESPIRATION RATE: 16 BRPM | OXYGEN SATURATION: 99 % | HEART RATE: 53 BPM | TEMPERATURE: 97.8 F | HEIGHT: 67 IN | DIASTOLIC BLOOD PRESSURE: 66 MMHG

## 2021-10-16 LAB
ANION GAP SERPL CALCULATED.3IONS-SCNC: 6 MMOL/L (ref 7–16)
BUN BLDV-MCNC: 22 MG/DL (ref 6–23)
CALCIUM SERPL-MCNC: 8.6 MG/DL (ref 8.6–10.2)
CHLORIDE BLD-SCNC: 107 MMOL/L (ref 98–107)
CO2: 24 MMOL/L (ref 22–29)
CREAT SERPL-MCNC: 1 MG/DL (ref 0.7–1.2)
GFR AFRICAN AMERICAN: >60
GFR NON-AFRICAN AMERICAN: >60 ML/MIN/1.73
GLUCOSE BLD-MCNC: 106 MG/DL (ref 74–99)
HCT VFR BLD CALC: 38.3 % (ref 37–54)
HEMOGLOBIN: 12.3 G/DL (ref 12.5–16.5)
MCH RBC QN AUTO: 29.3 PG (ref 26–35)
MCHC RBC AUTO-ENTMCNC: 32.1 % (ref 32–34.5)
MCV RBC AUTO: 91.2 FL (ref 80–99.9)
PDW BLD-RTO: 13 FL (ref 11.5–15)
PLATELET # BLD: 211 E9/L (ref 130–450)
PMV BLD AUTO: 10.2 FL (ref 7–12)
POTASSIUM REFLEX MAGNESIUM: 4.4 MMOL/L (ref 3.5–5)
RBC # BLD: 4.2 E12/L (ref 3.8–5.8)
SODIUM BLD-SCNC: 137 MMOL/L (ref 132–146)
WBC # BLD: 7.5 E9/L (ref 4.5–11.5)

## 2021-10-16 PROCEDURE — 93005 ELECTROCARDIOGRAM TRACING: CPT | Performed by: INTERNAL MEDICINE

## 2021-10-16 PROCEDURE — 85027 COMPLETE CBC AUTOMATED: CPT

## 2021-10-16 PROCEDURE — 6360000002 HC RX W HCPCS

## 2021-10-16 PROCEDURE — 6370000000 HC RX 637 (ALT 250 FOR IP): Performed by: INTERNAL MEDICINE

## 2021-10-16 PROCEDURE — 99232 SBSQ HOSP IP/OBS MODERATE 35: CPT | Performed by: INTERNAL MEDICINE

## 2021-10-16 PROCEDURE — 2580000003 HC RX 258: Performed by: FAMILY MEDICINE

## 2021-10-16 PROCEDURE — 36415 COLL VENOUS BLD VENIPUNCTURE: CPT

## 2021-10-16 PROCEDURE — 80048 BASIC METABOLIC PNL TOTAL CA: CPT

## 2021-10-16 RX ORDER — FLECAINIDE ACETATE 50 MG/1
50 TABLET ORAL EVERY 12 HOURS SCHEDULED
Qty: 60 TABLET | Refills: 3 | Status: SHIPPED | OUTPATIENT
Start: 2021-10-16 | End: 2022-01-14 | Stop reason: SDUPTHER

## 2021-10-16 RX ADMIN — APIXABAN 5 MG: 5 TABLET, FILM COATED ORAL at 09:07

## 2021-10-16 RX ADMIN — FLECAINIDE ACETATE 50 MG: 50 TABLET ORAL at 09:07

## 2021-10-16 RX ADMIN — SODIUM CHLORIDE, PRESERVATIVE FREE 10 ML: 5 INJECTION INTRAVENOUS at 09:08

## 2021-10-16 ASSESSMENT — PAIN SCALES - GENERAL
PAINLEVEL_OUTOF10: 0

## 2021-10-16 NOTE — PROGRESS NOTES
INPATIENT CARDIOLOGY FOLLOW-UP    Name: Aundrea Ty    Age: 77 y.o. Date of Admission: 10/15/2021 12:43 PM    Date of Service: 10/16/2021    Chief Complaint: Follow-up for WCT. Hx WPW    Interim History:  No new overnight cardiac complaints. Currently with no complaints of CP, SOB, palpitations, dizziness, or lightheadedness. He told me that he is feeling great and usually tends to have a slower heart rate. He would like to go home and does not want to stay anymore. SB on telemetry.     Review of Systems:   Cardiac: As per HPI  General: No fever, chills  Pulmonary: As per HPI  HEENT: No visual disturbances, difficult swallowing  GI: No nausea, vomiting  Endocrine: No thyroid disease or DM  Musculoskeletal: GUILLORY x 4, no focal motor deficits  Skin: Intact, no rashes  Neuro/Psych: No headache or seizures    Problem List:  Patient Active Problem List   Diagnosis    Wide-complex tachycardia ([address]    SVT (supraventricular tachycardia) (HCC)    History of Taye-Parkinson-White (WPW) syndrome    Atrial flutter (HCC)       Allergies:  No Known Allergies    Current Medications:  Current Facility-Administered Medications   Medication Dose Route Frequency Provider Last Rate Last Admin    vitamin B and C (TOTAL B-C) 1 tablet  1 tablet Oral Every Other Day Kennieth Comment Learn, APRN - CNP        vitamin D (CHOLECALCIFEROL) tablet 1,000 Units  1,000 Units Oral Every Other Day Kennieth Comment Learn, APRN - CNP        sodium chloride flush 0.9 % injection 5-40 mL  5-40 mL IntraVENous 2 times per day Kennieth Comment Learn, APRN - CNP   10 mL at 10/16/21 0908    sodium chloride flush 0.9 % injection 10 mL  10 mL IntraVENous PRN Kennieth Comment Learn, APRN - CNP        0.9 % sodium chloride infusion  25 mL IntraVENous PRN Kennieth Comment Learn, APRN - CNP        polyethylene glycol (GLYCOLAX) packet 17 g  17 g Oral Daily PRN Kennieth Comment Learn, APRN - CNP        acetaminophen (TYLENOL) tablet 650 mg  650 mg Oral Q6H PRN Kennieth Comment Learn, APRN - CNP        Or    acetaminophen (TYLENOL) suppository 650 mg  650 mg Rectal Q6H PRN Erik Bhagat Learn, APRN - CNP        potassium chloride (KLOR-CON M) extended release tablet 40 mEq  40 mEq Oral PRN Erik Bhagat Learn, APRN - CNP        Or    potassium bicarb-citric acid (EFFER-K) effervescent tablet 40 mEq  40 mEq Oral PRN Erik Bhagat Learn, APRN - CNP        Or    potassium chloride 10 mEq/100 mL IVPB (Peripheral Line)  10 mEq IntraVENous PRN Erik Bhagat Learn, APRN - CNP        flecainide (TAMBOCOR) tablet 50 mg  50 mg Oral 2 times per day Crystal Spain MD   50 mg at 10/16/21 0907    apixaban (ELIQUIS) tablet 5 mg  5 mg Oral BID Crystal Spain MD   5 mg at 10/16/21 0907      sodium chloride         Physical Exam:  BP (!) 190/74   Pulse 55   Temp 97.6 °F (36.4 °C) (Oral)   Resp 16   Ht 5' 7\" (1.702 m)   Wt 132 lb (59.9 kg)   SpO2 99%   BMI 20.67 kg/m²   Wt Readings from Last 3 Encounters:   10/16/21 132 lb (59.9 kg)   09/29/20 130 lb (59 kg)   12/21/18 135 lb (61.2 kg)     Appearance: Awake, alert, no acute respiratory distress  Skin: Intact, no rash  Head: Normocephalic, atraumatic  Eyes: EOMI, no conjunctival erythema  ENMT: No pharyngeal erythema, MMM, no rhinorrhea  Neck: Supple, no elevated JVP, no carotid bruits  Lungs: Clear to auscultation bilaterally. No wheezes, rales, or rhonchi. Cardiac: Regular rate and rhythm, +S1S2, no murmurs apparent  Abdomen: Soft, nontender, +bowel sounds  Extremities: Moves all extremities x 4, no lower extremity edema  Neurologic: No focal motor deficits apparent, normal mood and affect  Peripheral Pulses: Intact posterior tibial pulses bilaterally    Intake/Output:    Intake/Output Summary (Last 24 hours) at 10/16/2021 1342  Last data filed at 10/15/2021 1512  Gross per 24 hour   Intake 200 ml   Output --   Net 200 ml     No intake/output data recorded.     Laboratory Tests:  Recent Labs     10/15/21  1251 10/16/21  0430    137   K 4.9 4.4   CL 100 107   CO2 26 24   BUN 17 22   CREATININE 1.1 1.0   GLUCOSE 107* 106*   CALCIUM 9.6 8.6     Lab Results   Component Value Date    MG 2.2 10/15/2021     Recent Labs     10/15/21  1251   ALKPHOS 88   ALT 12   AST 22   PROT 7.8   BILITOT 0.7   LABALBU 4.7     Recent Labs     10/15/21  1251 10/16/21  0430   WBC 10.1 7.5   RBC 5.41 4.20   HGB 15.7 12.3*   HCT 48.4 38.3   MCV 89.5 91.2   MCH 29.0 29.3   MCHC 32.4 32.1   RDW 13.2 13.0    211   MPV 10.0 10.2     No results found for: CKTOTAL, CKMB, CKMBINDEX, TROPONINI  No results found for: INR, PROTIME  Lab Results   Component Value Date    TSH 1.490 10/15/2021     Lab Results   Component Value Date    LABA1C 5.0 10/15/2021     No results found for: EAG  Lab Results   Component Value Date    CHOL 189 10/15/2021     Lab Results   Component Value Date    TRIG 64 10/15/2021     Lab Results   Component Value Date    HDL 68 10/15/2021     Lab Results   Component Value Date    LDLCALC 108 (H) 10/15/2021     Lab Results   Component Value Date    LABVLDL 13 10/15/2021     No results found for: CHOLHDLRATIO    Cardiac Tests:  ECG: Wide-complex tachycardia with WPW syndrome mostly AVRT bypass pathway close to posterior septum. Telemetry findings reviewed: Sinus bradycardia with heart rate in the upper 50s    Vitals and labs were reviewed as above      GEORGE 10/15/21.    Ejection fraction is visually estimated at 50-55%.  Right ventricle global systolic function is normal .   No evidence of thrombus within left atrium/ left atrial appendage.   Emptying velocity is normal at 42 cms/s.   No evidence of thrombus or mass in the right atrium.   Moderate mitral regurgitation is present.   Mild aortic regurgitation is noted.   Mild to moderate tricuspid regurgitation.   Mild focal atherosclerosis in the descending aorta.     Successful DCCV at 150 J to NSR- 10/15/21.          Stress test:        Cardiac catheterization:       ASSESSMENT:  · SVT mostly AVRT can not rule out AT or flutter  · H/o WPW syndrome  · Hyperlipidemia not on statin  · History of skin cancer normal  · Blood pressure is elevated 190/74    Plan:   · Continue flecainide 50 mg p.o. twice daily and Eliquis 5 mg p.o. twice daily. · Discussed with  and [place] to follow-up with him as an outpatient. Discussed with the patient he is going to follow-up and go for an ablation as an outpatient. · He stable for discharge from the cardiology standpoint, will sign off, please call us if having further questions or concerns. · Repeat blood pressure and if the blood pressure remains elevated then consider adding Norvasc 5 mg p.o. daily. He needs to follow-up with Dr. Pao Richardson for management of his blood pressure. · Follow-up with cardiology and EP service in couple weeks. Festus Love MD., Fei Ramos.   [facility]) Cardiology

## 2021-10-16 NOTE — PROGRESS NOTES
Spoke with daughter 3x today. She and patient are both upset that he has not been discharged yet. Let both of them know that Dr. Lory Patel still needs to see him prior to discharge.

## 2021-10-16 NOTE — PLAN OF CARE
Problem: Cardiac:  Goal: Ability to maintain vital signs within normal range will improve  Description: Ability to maintain vital signs within normal range will improve  Outcome: Met This Shift  Goal: Cardiovascular alteration will improve  Description: Cardiovascular alteration will improve  Outcome: Met This Shift     Problem: Health Behavior:  Goal: Will modify at least one risk factor affecting health status  Description: Will modify at least one risk factor affecting health status  Outcome: Met This Shift  Goal: Identification of resources available to assist in meeting health care needs will improve  Description: Identification of resources available to assist in meeting health care needs will improve  Outcome: Met This Shift     Problem: Physical Regulation:  Goal: Complications related to the disease process, condition or treatment will be avoided or minimized  Description: Complications related to the disease process, condition or treatment will be avoided or minimized  Outcome: Met This Shift

## 2021-10-16 NOTE — PROGRESS NOTES
Message sent via [employer] to Dr. Armijo Ards about discharge (coveage by Iraida Matamoros NP). Patient stated he does not want to sign out AMA but the time may come for him to do so.  Called Dr. Odessa Crawford states she will round after 1500

## 2021-10-16 NOTE — H&P
[address] [facility]  History and Physical      CHIEF COMPLAINT:    Chief Complaint   Patient presents with    Tachycardia     140s, sent in by pcp, hx WPW syndrome, can feel palpitations / denies cp or sob    Fatigue        Patient of Raymona Merlin, MD presents with:  History of Taye-Parkinson-White (WPW) syndrome    History of Present Illness:   Patient is a 80-year-old male with past medical history of skin CA. Patient presented to the ED with complaints tachycardia. Patient admits this has been ongoing for the past day and he has felt palpitations. Patient states he was playing pickle ball and it may have triggered it and he had hoped it would go away however it did not. Patient did state approximately 10 years ago he was informed that he had Parkinson's white syndrome and at that time he was offered an ablation however he declined. REVIEW OF SYSTEMS:  Pertinent negatives are above in HPI. 10 point ROS otherwise negative. Past Medical History:   Diagnosis Date    Cancer ([facility] [address]     skin areas benign         Past Surgical History:   Procedure Laterality Date    TONSILLECTOMY         Medications Prior to Admission:    Medications Prior to Admission: vitamin D (CHOLECALCIFEROL) 1000 UNIT TABS tablet, Take 1,000 Units by mouth every other day   b complex vitamins capsule, Take 1 capsule by mouth every other day     Note that the patient's home medications were reviewed and the above list is accurate to the best of my knowledge at the time of the exam.    Allergies:    Patient has no known allergies. Social History:    reports that he quit smoking about 53 years ago. His smoking use included cigarettes. He has never used smokeless tobacco. He reports current alcohol use of about 2.0 standard drinks of alcohol per week. He reports that he does not use drugs. Family History:   family history includes Heart Attack (age of onset: 61) in his father;  Other in his mother and sister. PHYSICAL EXAM:    Vitals:  /66   Pulse 53   Temp 97.8 °F (36.6 °C) (Oral)   Resp 16   Ht 5' 7\" (1.702 m)   Wt 132 lb (59.9 kg)   SpO2 99%   BMI 20.67 kg/m²       General appearance: NAD, conversant, pleasant  Eyes: Sclerae anicteric, PERRLA  HEENT: AT/NC, MMM  Neck: FROM, supple, no thyromegaly  Lymph: No cervical / supraclavicular lymphadenopathy  Lungs: Clear to auscultation, WOB normal  CV: RRR, no MRGs, no lower extremity edema  Abdomen: Soft, non-tender; no masses or HSM, +BS  Extremities: FROM without synovitis. No clubbing or cyanosis of the hands. Skin: no rash, induration, lesions, or ulcers  Psych: Calm and cooperative. Normal judgement and insight. Normal mood and affect. Neuro: Alert and interactive, face symmetric, speech fluent. LABS:  All labs reviewed. Of note:  CBC with Differential:    Lab Results   Component Value Date    WBC 7.5 10/16/2021    RBC 4.20 10/16/2021    HGB 12.3 10/16/2021    HCT 38.3 10/16/2021     10/16/2021    MCV 91.2 10/16/2021    MCH 29.3 10/16/2021    MCHC 32.1 10/16/2021    RDW 13.0 10/16/2021    LYMPHOPCT 17.0 10/15/2021    MONOPCT 7.5 10/15/2021    BASOPCT 0.6 10/15/2021    MONOSABS 0.75 10/15/2021    LYMPHSABS 1.71 10/15/2021    EOSABS 0.03 10/15/2021    BASOSABS 0.06 10/15/2021     CMP:    Lab Results   Component Value Date     10/16/2021    K 4.4 10/16/2021     10/16/2021    CO2 24 10/16/2021    BUN 22 10/16/2021    CREATININE 1.0 10/16/2021    GFRAA >60 10/16/2021    LABGLOM >60 10/16/2021    GLUCOSE 106 10/16/2021    PROT 7.8 10/15/2021    LABALBU 4.7 10/15/2021    CALCIUM 8.6 10/16/2021    BILITOT 0.7 10/15/2021    ALKPHOS 88 10/15/2021    AST 22 10/15/2021    ALT 12 10/15/2021       Imaging:  CXR: Slightly limited exam grossly negative for acute process with no significant change.     EKG:  Initial EKG revealed wide QRS tachycardia-    Telemetry:      ASSESSMENT/PLAN:  Principal Problem:    History of Taye-Parkinson-White (WPW) syndrome  Active Problems:    SVT (supraventricular tachycardia) (HCC)  Resolved Problems:    * No resolved hospital problems. *    67-year-old male with a past medical history of skin CA admitted to telemetry unit with    Jarred Aguirre Parkinson White syndrome  -Heparin drip initiated - discontinued  -Avoid jeni blocking agents  -Monitor labs supplement electrolytes as needed to maintain mag greater than 2 and potassium greater than 4  .-Patient was DCCV with 150 J to normal sinus rhythm on 10/15 in the ED.  -Patient to follow up with Dr. Connor Mcdowell to have ablation as an outpatient. Medication for other comorbidities continue as appropriate dose adjustment as necessary. DVT prophylaxis  PT OT  Discharge planning  Case discussed with attending and agreed upon plan of care. Code status: Full  Requires inpatient level of care  AL Gutierrez - CNP    4:15 PM  10/16/2021    Above note edited to reflect my thoughts     I personally saw, examined and provided care for the patient. Radiographs, labs and medication list were reviewed by me independently. The case was discussed in detail and plans for care were established. Review of [address] [place] [address], documentation was conducted and revisions were made as appropriate directly by me. I agree with the above documented exam, problem list, and plan of care.      Marigene Kehr, MD  10/16/2021

## 2021-10-18 LAB
EKG ATRIAL RATE: 59 BPM
EKG ATRIAL RATE: 59 BPM
EKG P AXIS: 80 DEGREES
EKG P AXIS: 83 DEGREES
EKG P-R INTERVAL: 122 MS
EKG P-R INTERVAL: 132 MS
EKG Q-T INTERVAL: 468 MS
EKG Q-T INTERVAL: 484 MS
EKG QRS DURATION: 146 MS
EKG QRS DURATION: 148 MS
EKG QTC CALCULATION (BAZETT): 463 MS
EKG QTC CALCULATION (BAZETT): 479 MS
EKG R AXIS: -12 DEGREES
EKG R AXIS: -30 DEGREES
EKG T AXIS: 79 DEGREES
EKG T AXIS: 80 DEGREES
EKG VENTRICULAR RATE: 59 BPM
EKG VENTRICULAR RATE: 59 BPM

## 2021-11-09 ENCOUNTER — OFFICE VISIT (OUTPATIENT)
Dept: CARDIOLOGY CLINIC | Age: 76
End: 2021-11-09
Payer: MEDICARE

## 2021-11-09 VITALS
HEART RATE: 48 BPM | HEIGHT: 67 IN | BODY MASS INDEX: 20.31 KG/M2 | SYSTOLIC BLOOD PRESSURE: 158 MMHG | WEIGHT: 129.4 LBS | RESPIRATION RATE: 12 BRPM | DIASTOLIC BLOOD PRESSURE: 80 MMHG

## 2021-11-09 DIAGNOSIS — I48.92 ATRIAL FLUTTER, UNSPECIFIED TYPE (HCC): ICD-10-CM

## 2021-11-09 DIAGNOSIS — R00.0 WIDE-COMPLEX TACHYCARDIA: Primary | ICD-10-CM

## 2021-11-09 DIAGNOSIS — I10 ESSENTIAL HYPERTENSION: ICD-10-CM

## 2021-11-09 DIAGNOSIS — I47.1 SVT (SUPRAVENTRICULAR TACHYCARDIA) (HCC): ICD-10-CM

## 2021-11-09 DIAGNOSIS — E78.49 OTHER HYPERLIPIDEMIA: ICD-10-CM

## 2021-11-09 DIAGNOSIS — Z86.79 HISTORY OF WOLFF-PARKINSON-WHITE (WPW) SYNDROME: ICD-10-CM

## 2021-11-09 PROCEDURE — 93000 ELECTROCARDIOGRAM COMPLETE: CPT | Performed by: INTERNAL MEDICINE

## 2021-11-09 PROCEDURE — 99214 OFFICE O/P EST MOD 30 MIN: CPT | Performed by: INTERNAL MEDICINE

## 2021-11-09 NOTE — PATIENT INSTRUCTIONS
 Continue all your medications at current doses.  We will schedule a follow up with electrophysiology.  Call me in a week if pressures are above goal.    I will give you a handout for healthy diet   Restrict sodium intake to less than 2-2.5 g/day. Restrict fluid intake to less than 2.2 L/day. Goal BP is less than 130/80.  Please try to exercise for 150 minutes a week.  I will see you back in the office in 6 months. Please call the office at ([phone], option 2) if you have any questions.

## 2021-11-09 NOTE — PROGRESS NOTES
CHIEF COMPLAINT:   Chief Complaint   Patient presents with    Tachycardia     post hosp on 10/15/21 s/p DCCV on 10/15/21        HISTORY OF PRESENT ILLNESS: Patient is a 77 y.o. male who is here for a follow up visit with me. He has a history of Taye-Parkinson-White syndrome, atrial tachycardia/paroxysmal atrial flutter. He was admitted to the hospital on 10/15/2021. He was diagnosed with WPW about 20 years ago at [place] recommended ablation but declined at that time.      Saw Ann Thomas approximately 4 years ago since he wasn't covered by insurance never followed up--> ablation was recommended at that time.     Presented with palpitations and was found to have wide-complex regular tachycardia.     Was in [facility]. Hemodynamically stable. Taken for a GEORGE guided cardioversion. Procedure summarized below. He was discharged the following day. He was started on flecainide as well as Eliquis. He has been doing well since then. No further visits to the hospital or emergency room. He has been compliant with both medications. He did have 2 episodes of palpitations that were short lasting. Not associated with dizziness, lightheadedness or falls. At today's office visit, He  denies any chest pain, shortness of breath, palpitations, dizziness, pedal edema. The patient is capable of activities of daily living. There is dyspnea on more than moderate exertion. There is no orthopnea or PND. He is compliant with medications as well as diet and exercise regimen. Prior Cardiac workup:  GEORGE on 10/15/2021: EF is 50 to 55%. Normal RV function. Emptying velocity of left atrial appendage is 42 cm/s. Moderate mitral regurgitation. Mild to moderate tricuspid regurgitation. Status post successful cardioversion with 150 J on 10/15/2021.       Past Medical History:   Diagnosis Date    Cancer [facility])     skin areas benign    Wide-complex tachycardia (HCC)        No Known Allergies    Current Outpatient Medications Medication Sig Dispense Refill    apixaban (ELIQUIS) 5 MG TABS tablet Take 1 tablet by mouth 2 times daily 120 tablet 1    flecainide (TAMBOCOR) 50 MG tablet Take 1 tablet by mouth every 12 hours 60 tablet 3    vitamin D (CHOLECALCIFEROL) 1000 UNIT TABS tablet Take 1,000 Units by mouth every other day       b complex vitamins capsule Take 1 capsule by mouth every other day        No current facility-administered medications for this visit. Social History     Socioeconomic History    Marital status:      Spouse name: Not on file    Number of children: Not on file    Years of education: Not on file    Highest education level: Not on file   Occupational History    Not on file   Tobacco Use    Smoking status: Former Smoker     Types: Cigarettes     Quit date: 1967     Years since quittin.9    Smokeless tobacco: Never Used   Substance and Sexual Activity    Alcohol use: Yes     Alcohol/week: 2.0 standard drinks     Types: 2 Cans of beer per week     Comment: weekly     Drug use: Never    Sexual activity: Not on file   Other Topics Concern    Not on file   Social History Narrative    Not on file     Social Determinants of Health     Financial Resource Strain:     Difficulty of Paying Living Expenses: Not on file   Food Insecurity:     Worried About Running Out of Food in the Last Year: Not on file    Clay of Food in the Last Year: Not on file   Transportation Needs:     Lack of Transportation (Medical): Not on file    Lack of Transportation (Non-Medical):  Not on file   Physical Activity:     Days of Exercise per Week: Not on file    Minutes of Exercise per Session: Not on file   Stress:     Feeling of Stress : Not on file   Social Connections:     Frequency of Communication with Friends and Family: Not on file    Frequency of Social Gatherings with Friends and Family: Not on file    Attends Worship Services: Not on file    Active Member of Clubs or Organizations: Not on file    Attends Club or Organization Meetings: Not on file    Marital Status: Not on file   Intimate Partner Violence:     Fear of Current or Ex-Partner: Not on file    Emotionally Abused: Not on file    Physically Abused: Not on file    Sexually Abused: Not on file   Housing Stability:     Unable to Pay for Housing in the Last Year: Not on file    Number of [place] in the Last Year: Not on file    Unstable Housing in the Last Year: Not on file       Family History   Problem Relation Age of Onset    Other Mother         old age   [facility] Heart Attack Father 61    Other Sister         thyroid       Review of Systems  Constitutional: Negative for fever, malaise/fatigue and weight loss. HENT: Negative for sore throat and tinnitus. Eyes: Negative for blurred vision and double vision. Respiratory: Negative for shortness of breath. Negative for cough and wheezing. Cardiovascular: As mentioned in HPI. Gastrointestinal: Negative for abdominal pain, heartburn, nausea and vomiting. Genitourinary: Negative. Musculoskeletal: Negative for back pain, joint pain and myalgias. Neurological: Negative for dizziness, tremors, loss of consciousness and headaches. Endo/Heme/Allergies: Negative. Psychiatric/Behavioral: Negative for depression and suicidal ideas. Physical Exam   BP (!) 158/80   Pulse (!) 48   Resp 12   Ht 5' 7\" (1.702 m)   Wt 129 lb 6.4 oz (58.7 kg)   BMI 20.27 kg/m²   Constitutional: Oriented to person, place, and time. Well-developed and well-nourished. No distress. Head: Normocephalic and atraumatic. Eyes: EOM are normal. Pupils are equal, round, and reactive to light. Neck: Normal range of motion. Neck supple. No hepatojugular reflux and no JVD present. Carotid bruit is not present. No tracheal deviation present. No thyromegaly present. Cardiovascular: Normal rate, regular rhythm, normal heart sounds and intact distal pulses.   Exam reveals no gallop and no friction rub.  No murmur heard. Pulmonary/Chest: Effort normal and breath sounds normal. No respiratory distress. No wheezes. No rales. No tenderness. Abdominal: Soft. Bowel sounds are normal. No distension and no mass. No tenderness. No rebound and no guarding. Musculoskeletal: Normal range of motion. No edema and no tenderness. Lymphadenopathy:   No cervical adenopathy. Neurological: Alert and oriented to person, place, and time. Skin: Skin is warm and dry. No rash noted. Not diaphoretic. No erythema. Psychiatric: Normal mood and affect. Behavior is normal.     Procedures and Testing  EKG: In the office today and reviewed by me. It is bradycardia. Preexcitation seen. Right bundle branch block. ASSESSMENT:   Diagnosis Orders   1. Wide-complex tachycardia (HCC)  EKG 12 Lead   2. SVT (supraventricular tachycardia) ([facility] [address]  [address] [facility], Laurie David, DO, Electrophysiology, [place]   3. Atrial flutter, unspecified type ([facility] [address]     4. History of Taye-Parkinson-White (WPW) syndrome  [address], DO, Electrophysiology, [place]   5. Essential hypertension     6. Other hyperlipidemia           1. Supraventricular tachycardia-likely atrial tachycardia versus atrial flutter with preexcitation, paroxysmal: He is back in normal sinus rhythm with preexcitation currently. I will continue with anticoagulation with Eliquis. He will continue with flecainide at 50 mg twice daily. I will schedule him for a follow-up with electrophysiology for consideration of an EP study and ablation. He is agreeable now to do so. His underlying SVT rhythm was unclear. Thus, I will continue with Eliquis for now. There is no evidence of flutter or fibrillation, Eliquis discontinuation can be considered in the future. Will defer to electrophysiology on this. 2.  Hypertension: He does not have a known history of hypertension. He states his pressures are better controlled at home. Pressures have been elevated here.   Goal for him is less than 130/80. Salt restriction counseling provided. He will monitor pressures at home and call me if they are above goal.  3.  Hyperlipidemia: Lipid panel from 10/15/2021: 189/64/68/108. LDL is near goal.  Continue with dietary and lifestyle modifications. Follow-up with me in the office in 6 months. Steve Raygoza MD  [facility]) Cardiology     NOTE: This report was transcribed using voice recognition software. Every effort was made to ensure accuracy; however, inadvertent computerized transcription errors may be present.

## 2021-11-11 ENCOUNTER — TELEPHONE (OUTPATIENT)
Dept: ADMINISTRATIVE | Age: 76
End: 2021-11-11

## 2021-11-11 NOTE — TELEPHONE ENCOUNTER
Pt needs appt w/ Electro. No openings ?   I47.1 (ICD-10-CM) - SVT (supraventricular tachycardia) (HCC)  Z86.79 (ICD-10-CM) - History of Taye-Parkinson-White (WPW) syndrome   Referred by Dr Ana Nunez.    [phone]

## 2022-01-14 RX ORDER — FLECAINIDE ACETATE 50 MG/1
50 TABLET ORAL EVERY 12 HOURS SCHEDULED
Qty: 60 TABLET | Refills: 3 | Status: ON HOLD
Start: 2022-01-14 | End: 2022-02-08 | Stop reason: HOSPADM

## 2022-01-18 ENCOUNTER — OFFICE VISIT (OUTPATIENT)
Dept: NON INVASIVE DIAGNOSTICS | Age: 77
End: 2022-01-18
Payer: MEDICARE

## 2022-01-18 VITALS
DIASTOLIC BLOOD PRESSURE: 98 MMHG | RESPIRATION RATE: 18 BRPM | HEART RATE: 53 BPM | HEIGHT: 67 IN | SYSTOLIC BLOOD PRESSURE: 140 MMHG | BODY MASS INDEX: 20.5 KG/M2 | WEIGHT: 130.6 LBS

## 2022-01-18 DIAGNOSIS — I47.1 SVT (SUPRAVENTRICULAR TACHYCARDIA) (HCC): Primary | ICD-10-CM

## 2022-01-18 PROBLEM — S13.9XXA NECK SPRAIN: Status: ACTIVE | Noted: 2022-01-18

## 2022-01-18 PROBLEM — K21.9 GASTROESOPHAGEAL REFLUX DISEASE: Status: ACTIVE | Noted: 2022-01-18

## 2022-01-18 PROBLEM — J30.9 ALLERGIC RHINITIS: Status: ACTIVE | Noted: 2022-01-18

## 2022-01-18 PROBLEM — E55.9 VITAMIN D DEFICIENCY: Status: ACTIVE | Noted: 2022-01-18

## 2022-01-18 PROBLEM — R07.89 OTHER CHEST PAIN: Status: ACTIVE | Noted: 2022-01-18

## 2022-01-18 PROBLEM — H93.19 TINNITUS: Status: ACTIVE | Noted: 2022-01-18

## 2022-01-18 PROBLEM — R79.89 LOW VITAMIN D LEVEL: Status: ACTIVE | Noted: 2022-01-18

## 2022-01-18 PROBLEM — I45.6 WOLFF-PARKINSON-WHITE (WPW) PATTERN: Status: ACTIVE | Noted: 2022-01-18

## 2022-01-18 PROCEDURE — 4040F PNEUMOC VAC/ADMIN/RCVD: CPT | Performed by: STUDENT IN AN ORGANIZED HEALTH CARE EDUCATION/TRAINING PROGRAM

## 2022-01-18 PROCEDURE — G8427 DOCREV CUR MEDS BY ELIG CLIN: HCPCS | Performed by: STUDENT IN AN ORGANIZED HEALTH CARE EDUCATION/TRAINING PROGRAM

## 2022-01-18 PROCEDURE — 99205 OFFICE O/P NEW HI 60 MIN: CPT | Performed by: STUDENT IN AN ORGANIZED HEALTH CARE EDUCATION/TRAINING PROGRAM

## 2022-01-18 PROCEDURE — 1036F TOBACCO NON-USER: CPT | Performed by: STUDENT IN AN ORGANIZED HEALTH CARE EDUCATION/TRAINING PROGRAM

## 2022-01-18 PROCEDURE — G8420 CALC BMI NORM PARAMETERS: HCPCS | Performed by: STUDENT IN AN ORGANIZED HEALTH CARE EDUCATION/TRAINING PROGRAM

## 2022-01-18 PROCEDURE — 3017F COLORECTAL CA SCREEN DOC REV: CPT | Performed by: STUDENT IN AN ORGANIZED HEALTH CARE EDUCATION/TRAINING PROGRAM

## 2022-01-18 PROCEDURE — G8484 FLU IMMUNIZE NO ADMIN: HCPCS | Performed by: STUDENT IN AN ORGANIZED HEALTH CARE EDUCATION/TRAINING PROGRAM

## 2022-01-18 PROCEDURE — 1123F ACP DISCUSS/DSCN MKR DOCD: CPT | Performed by: STUDENT IN AN ORGANIZED HEALTH CARE EDUCATION/TRAINING PROGRAM

## 2022-01-18 PROCEDURE — 93000 ELECTROCARDIOGRAM COMPLETE: CPT | Performed by: STUDENT IN AN ORGANIZED HEALTH CARE EDUCATION/TRAINING PROGRAM

## 2022-01-18 NOTE — PATIENT INSTRUCTIONS
You will be contacted to schedule electrophysiology study and ablation. Do not take apixaban (Eliquis) for 24 hours prior to procedure. Do not take flecainide for 5 days prior to procedure. Follow-up with Dr Issac Benítez office 1 month after ablation.

## 2022-02-07 ENCOUNTER — TELEPHONE (OUTPATIENT)
Dept: CARDIAC CATH/INVASIVE PROCEDURES | Age: 77
End: 2022-02-07

## 2022-02-07 ENCOUNTER — ANESTHESIA EVENT (OUTPATIENT)
Dept: CARDIAC CATH/INVASIVE PROCEDURES | Age: 77
End: 2022-02-07

## 2022-02-07 NOTE — ANESTHESIA PRE PROCEDURE
[address] [facility]  [address]  [address]  [place] acute hospital [address]  Phone: [phone]  Fax: [address] [facility], JACQUELINE - CNP        May 8, 2019     Patient: Marline Santa   YOB: 1954   Date of Visit: 5/8/2019       To Whom It May Concern: It is my medical opinion that Marline Santa  Should remain out of work through 5-27-19 and may return on 5-28-19. If you have any questions or concerns, please don't hesitate to call.     Sincerely,        Zonia Carrel, APRN - CNP Department of Anesthesiology  Preprocedure Note       Name:  Ronen Vaughan   Age:  77 y.o.  :  1945                                          MRN:  [id number]         Date:  2022      Surgeon: Christopher Richard    Procedure: EPS , possible SVT ablation    Medications prior to admission:   Prior to Admission medications    Medication Sig Start Date End Date Taking? Authorizing Provider   Multiple Vitamin (MULTI VITAMIN DAILY PO) Take by mouth    Historical Provider, MD   flecainide (TAMBOCOR) 50 MG tablet Take 1 tablet by mouth every 12 hours 22   Marlo Corrigan MD   apixaban (ELIQUIS) 5 MG TABS tablet Take 1 tablet by mouth 2 times daily 21   Marlo Corrigan MD       Current medications:    Current Outpatient Medications   Medication Sig Dispense Refill    Multiple Vitamin (MULTI VITAMIN DAILY PO) Take by mouth      flecainide (TAMBOCOR) 50 MG tablet Take 1 tablet by mouth every 12 hours 60 tablet 3    apixaban (ELIQUIS) 5 MG TABS tablet Take 1 tablet by mouth 2 times daily 120 tablet 1     No current facility-administered medications for this visit. Allergies: Allergies   Allergen Reactions    Hydrochlorothiazide      Other reaction(s): Constipation       Problem List:    Patient Active Problem List   Diagnosis Code    Wide-complex tachycardia (HCC) I47.2    SVT (supraventricular tachycardia) (HCC) I47.1    History of Taye-Parkinson-White (WPW) syndrome Z86.79    Atrial flutter (HCC) I48.92    Essential hypertension I10    Other hyperlipidemia E78.49    Allergic rhinitis J30.9    Gastroesophageal reflux disease K21.9    Low vitamin D level R79.89    Neck sprain S13. 9XXA    Other chest pain R07.89    Tinnitus H93.19    Vitamin D deficiency E55.9    Taye-Parkinson-White (WPW) pattern I45.6       Past Medical History:        Diagnosis Date    Cancer ([address]     skin areas benign    Wide-complex tachycardia ([address]        Past Surgical History:        Procedure Laterality Date    TONSILLECTOMY         Social History:    Social History     Tobacco Use    Smoking status: Former Smoker     Types: Cigarettes     Quit date: 1967     Years since quittin.1    Smokeless tobacco: Never Used   Substance Use Topics    Alcohol use: Yes     Alcohol/week: 2.0 standard drinks     Types: 2 Cans of beer per week     Comment: weekly                                 Counseling given: Not Answered      Vital Signs (Current): There were no vitals filed for this visit. BP Readings from Last 3 Encounters:   22 (!) 140/98   21 (!) 158/80   10/16/21 118/66       NPO Status:  >8 hrs                                                                               BMI:   Wt Readings from Last 3 Encounters:   22 130 lb 9.6 oz (59.2 kg)   21 129 lb 6.4 oz (58.7 kg)   10/16/21 132 lb (59.9 kg)     There is no height or weight on file to calculate BMI.    CBC:   Lab Results   Component Value Date    WBC 7.5 10/16/2021    RBC 4.20 10/16/2021    HGB 12.3 10/16/2021    HCT 38.3 10/16/2021    MCV 91.2 10/16/2021    RDW 13.0 10/16/2021     10/16/2021       CMP:   Lab Results   Component Value Date     10/16/2021    K 4.4 10/16/2021     10/16/2021    CO2 24 10/16/2021    BUN 22 10/16/2021    CREATININE 1.0 10/16/2021    GFRAA >60 10/16/2021    LABGLOM >60 10/16/2021    GLUCOSE 106 10/16/2021    PROT 7.8 10/15/2021    CALCIUM 8.6 10/16/2021    BILITOT 0.7 10/15/2021    ALKPHOS 88 10/15/2021    AST 22 10/15/2021    ALT 12 10/15/2021       POC Tests: No results for input(s): POCGLU, POCNA, POCK, POCCL, POCBUN, POCHEMO, POCHCT in the last 72 hours.     Coags:   Lab Results   Component Value Date    APTT 29.2 10/15/2021       HCG (If Applicable): No results found for: PREGTESTUR, PREGSERUM, HCG, HCGQUANT     ABGs: No results found for: PHART, PO2ART, DDG8ARE, FJI7WWX, BEART, I6ITPYFJ     Type & Screen (If Applicable):  No results found for: [facility]    Drug/Infectious Status (If Applicable):  No results found for: HIV, HEPCAB    COVID-19 Screening (If Applicable): No results found for: COVID19    EKG 1/18/21   NSR w RBBB    ECHO 10/15/21  Summary   Ejection fraction is visually estimated at 50-55%. Right ventricle global systolic function is normal .   No evidence of thrombus within left atrium/ left atrial appendage. Emptying velocity is normal at 42 cms/s. No evidence of thrombus or mass in the right atrium. Moderate mitral regurgitation is present. Mild aortic regurgitation is noted. Mild to moderate tricuspid regurgitation. Mild focal atherosclerosis in the descending aorta. STRESS TEST 12/21/18  1. Exercise EKG was negative  2. The patient experienced no chest pain with exercise. 3. The myocardial perfusion imaging was normal.    4. Overall left ventricular systolic function was normal without regional wall motion abnormalities. 5. [place] treadmill score was 9 implying low risk. 6. Exercise capacity was above average. 7. Low risk   Treadmill perfusion test.      Anesthesia Evaluation  Patient summary reviewed and Nursing notes reviewed no history of anesthetic complications:   Airway: Mallampati: III  TM distance: <3 FB   Neck ROM: full  Mouth opening: < 3 FB Dental: normal exam         Pulmonary:Negative Pulmonary ROS and normal exam  breath sounds clear to auscultation      (-) not a current smoker                           Cardiovascular:  Exercise tolerance: good (>4 METS),   (+) hypertension: mild, valvular problems/murmurs (Moderate mitral regurgitation is present.): MR and AI, dysrhythmias (WPW): atrial fibrillation, SVT and atrial flutter,     Angina: pt states he has jaw pain associated with dysrhythmia. Last jaw pain was about a year ago.     ECG reviewed  Rhythm: regular  Rate: normal  Echocardiogram reviewed  Stress test reviewed  Cleared by cardiology              Neuro/Psych:   (+) depression/anxiety ROS comment: claustrophobia GI/Hepatic/Renal: Neg GI/Hepatic/Renal ROS  (+) GERD: well controlled,           Endo/Other:    (+) blood dyscrasia: anticoagulation therapy:., malignancy/cancer (skin). (-) diabetes mellitus        Pt had no PAT visit        ROS comment: B knee arthroscopic surgery Abdominal:       Abdomen: soft. Vascular: negative vascular ROS. Other Findings:             Anesthesia Plan      MAC     ASA 3     (18g PIVs bilat AC)  Induction: intravenous. Anesthetic plan and risks discussed with patient. Use of blood products discussed with patient whom consented to blood products. Plan discussed with attending.               Freddie Mandujano, LOIS   2/7/2022

## 2022-02-07 NOTE — TELEPHONE ENCOUNTER
Reminded patient of scheduled procedure on 2/8  Instructions given and COVID questionnaire completed.

## 2022-02-08 ENCOUNTER — HOSPITAL ENCOUNTER (OUTPATIENT)
Dept: CARDIAC CATH/INVASIVE PROCEDURES | Age: 77
Discharge: HOME | End: 2022-02-08
Attending: STUDENT IN AN ORGANIZED HEALTH CARE EDUCATION/TRAINING PROGRAM | Admitting: STUDENT IN AN ORGANIZED HEALTH CARE EDUCATION/TRAINING PROGRAM
Payer: MEDICARE

## 2022-02-08 ENCOUNTER — ANESTHESIA (OUTPATIENT)
Dept: CARDIAC CATH/INVASIVE PROCEDURES | Age: 77
End: 2022-02-08

## 2022-02-08 VITALS
OXYGEN SATURATION: 98 % | WEIGHT: 130 LBS | BODY MASS INDEX: 20.4 KG/M2 | TEMPERATURE: 97.7 F | DIASTOLIC BLOOD PRESSURE: 66 MMHG | HEART RATE: 62 BPM | RESPIRATION RATE: 16 BRPM | SYSTOLIC BLOOD PRESSURE: 156 MMHG | HEIGHT: 67 IN

## 2022-02-08 VITALS
RESPIRATION RATE: 17 BRPM | OXYGEN SATURATION: 100 % | SYSTOLIC BLOOD PRESSURE: 152 MMHG | DIASTOLIC BLOOD PRESSURE: 86 MMHG

## 2022-02-08 DIAGNOSIS — Z86.79 S/P CATHETER ABLATION OF SLOW PATHWAY: ICD-10-CM

## 2022-02-08 DIAGNOSIS — I47.1 SVT (SUPRAVENTRICULAR TACHYCARDIA) (HCC): Primary | ICD-10-CM

## 2022-02-08 DIAGNOSIS — Z98.890 S/P CATHETER ABLATION OF SLOW PATHWAY: ICD-10-CM

## 2022-02-08 LAB
ANION GAP SERPL CALCULATED.3IONS-SCNC: 11 MMOL/L (ref 7–16)
BASOPHILS ABSOLUTE: 0.05 E9/L (ref 0–0.2)
BASOPHILS RELATIVE PERCENT: 0.7 % (ref 0–2)
BUN BLDV-MCNC: 15 MG/DL (ref 6–23)
CALCIUM SERPL-MCNC: 9 MG/DL (ref 8.6–10.2)
CHLORIDE BLD-SCNC: 102 MMOL/L (ref 98–107)
CO2: 25 MMOL/L (ref 22–29)
CREAT SERPL-MCNC: 1 MG/DL (ref 0.7–1.2)
EKG ATRIAL RATE: 55 BPM
EKG P AXIS: 95 DEGREES
EKG P-R INTERVAL: 142 MS
EKG Q-T INTERVAL: 444 MS
EKG QRS DURATION: 142 MS
EKG QTC CALCULATION (BAZETT): 424 MS
EKG R AXIS: 58 DEGREES
EKG T AXIS: 45 DEGREES
EKG VENTRICULAR RATE: 55 BPM
EOSINOPHILS ABSOLUTE: 0.23 E9/L (ref 0.05–0.5)
EOSINOPHILS RELATIVE PERCENT: 3 % (ref 0–6)
GFR AFRICAN AMERICAN: >60
GFR NON-AFRICAN AMERICAN: >60 ML/MIN/1.73
GLUCOSE BLD-MCNC: 101 MG/DL (ref 74–99)
HCT VFR BLD CALC: 42.5 % (ref 37–54)
HEMOGLOBIN: 13.9 G/DL (ref 12.5–16.5)
IMMATURE GRANULOCYTES #: 0.02 E9/L
IMMATURE GRANULOCYTES %: 0.3 % (ref 0–5)
LYMPHOCYTES ABSOLUTE: 2.62 E9/L (ref 1.5–4)
LYMPHOCYTES RELATIVE PERCENT: 34.5 % (ref 20–42)
MAGNESIUM: 2.3 MG/DL (ref 1.6–2.6)
MCH RBC QN AUTO: 29.1 PG (ref 26–35)
MCHC RBC AUTO-ENTMCNC: 32.7 % (ref 32–34.5)
MCV RBC AUTO: 88.9 FL (ref 80–99.9)
MONOCYTES ABSOLUTE: 0.67 E9/L (ref 0.1–0.95)
MONOCYTES RELATIVE PERCENT: 8.8 % (ref 2–12)
NEUTROPHILS ABSOLUTE: 4 E9/L (ref 1.8–7.3)
NEUTROPHILS RELATIVE PERCENT: 52.7 % (ref 43–80)
PDW BLD-RTO: 12.7 FL (ref 11.5–15)
PLATELET # BLD: 237 E9/L (ref 130–450)
PMV BLD AUTO: 9.8 FL (ref 7–12)
POTASSIUM SERPL-SCNC: 3.5 MMOL/L (ref 3.5–5)
RBC # BLD: 4.78 E12/L (ref 3.8–5.8)
SODIUM BLD-SCNC: 138 MMOL/L (ref 132–146)
WBC # BLD: 7.6 E9/L (ref 4.5–11.5)

## 2022-02-08 PROCEDURE — C1893 INTRO/SHEATH, FIXED,NON-PEEL: HCPCS

## 2022-02-08 PROCEDURE — 2580000003 HC RX 258: Performed by: NURSE ANESTHETIST, CERTIFIED REGISTERED

## 2022-02-08 PROCEDURE — 2709999900 HC NON-CHARGEABLE SUPPLY

## 2022-02-08 PROCEDURE — 93653 COMPRE EP EVAL TX SVT: CPT | Performed by: STUDENT IN AN ORGANIZED HEALTH CARE EDUCATION/TRAINING PROGRAM

## 2022-02-08 PROCEDURE — 36415 COLL VENOUS BLD VENIPUNCTURE: CPT

## 2022-02-08 PROCEDURE — G0269 OCCLUSIVE DEVICE IN VEIN ART: HCPCS

## 2022-02-08 PROCEDURE — 93655 ICAR CATH ABLTJ DSCRT ARRHYT: CPT

## 2022-02-08 PROCEDURE — 93246 EXT ECG>7D<15D RECORDING: CPT

## 2022-02-08 PROCEDURE — 6370000000 HC RX 637 (ALT 250 FOR IP): Performed by: STUDENT IN AN ORGANIZED HEALTH CARE EDUCATION/TRAINING PROGRAM

## 2022-02-08 PROCEDURE — C1760 CLOSURE DEV, VASC: HCPCS

## 2022-02-08 PROCEDURE — 93005 ELECTROCARDIOGRAM TRACING: CPT | Performed by: STUDENT IN AN ORGANIZED HEALTH CARE EDUCATION/TRAINING PROGRAM

## 2022-02-08 PROCEDURE — 93653 COMPRE EP EVAL TX SVT: CPT

## 2022-02-08 PROCEDURE — 3700000001 HC ADD 15 MINUTES (ANESTHESIA)

## 2022-02-08 PROCEDURE — 2500000003 HC RX 250 WO HCPCS

## 2022-02-08 PROCEDURE — C1894 INTRO/SHEATH, NON-LASER: HCPCS

## 2022-02-08 PROCEDURE — 93655 ICAR CATH ABLTJ DSCRT ARRHYT: CPT | Performed by: STUDENT IN AN ORGANIZED HEALTH CARE EDUCATION/TRAINING PROGRAM

## 2022-02-08 PROCEDURE — C1732 CATH, EP, DIAG/ABL, 3D/VECT: HCPCS

## 2022-02-08 PROCEDURE — 3700000000 HC ANESTHESIA ATTENDED CARE

## 2022-02-08 PROCEDURE — 80048 BASIC METABOLIC PNL TOTAL CA: CPT

## 2022-02-08 PROCEDURE — 6360000002 HC RX W HCPCS: Performed by: NURSE ANESTHETIST, CERTIFIED REGISTERED

## 2022-02-08 PROCEDURE — 83735 ASSAY OF MAGNESIUM: CPT

## 2022-02-08 PROCEDURE — 6360000002 HC RX W HCPCS

## 2022-02-08 PROCEDURE — 85025 COMPLETE CBC W/AUTO DIFF WBC: CPT

## 2022-02-08 PROCEDURE — C1730 CATH, EP, 19 OR FEW ELECT: HCPCS

## 2022-02-08 RX ORDER — SODIUM CHLORIDE 9 MG/ML
INJECTION, SOLUTION INTRAVENOUS CONTINUOUS PRN
Status: DISCONTINUED | OUTPATIENT
Start: 2022-02-08 | End: 2022-02-08 | Stop reason: SDUPTHER

## 2022-02-08 RX ORDER — SODIUM CHLORIDE 0.9 % (FLUSH) 0.9 %
5-40 SYRINGE (ML) INJECTION PRN
Status: DISCONTINUED | OUTPATIENT
Start: 2022-02-08 | End: 2022-02-08 | Stop reason: HOSPADM

## 2022-02-08 RX ORDER — SODIUM CHLORIDE 0.9 % (FLUSH) 0.9 %
5-40 SYRINGE (ML) INJECTION EVERY 12 HOURS SCHEDULED
Status: DISCONTINUED | OUTPATIENT
Start: 2022-02-08 | End: 2022-02-08 | Stop reason: HOSPADM

## 2022-02-08 RX ORDER — PROPOFOL 10 MG/ML
INJECTION, EMULSION INTRAVENOUS CONTINUOUS PRN
Status: DISCONTINUED | OUTPATIENT
Start: 2022-02-08 | End: 2022-02-08 | Stop reason: SDUPTHER

## 2022-02-08 RX ORDER — MIDAZOLAM HYDROCHLORIDE 1 MG/ML
INJECTION INTRAMUSCULAR; INTRAVENOUS PRN
Status: DISCONTINUED | OUTPATIENT
Start: 2022-02-08 | End: 2022-02-08 | Stop reason: SDUPTHER

## 2022-02-08 RX ORDER — FENTANYL CITRATE 50 UG/ML
INJECTION, SOLUTION INTRAMUSCULAR; INTRAVENOUS PRN
Status: DISCONTINUED | OUTPATIENT
Start: 2022-02-08 | End: 2022-02-08 | Stop reason: SDUPTHER

## 2022-02-08 RX ORDER — SODIUM CHLORIDE 9 MG/ML
25 INJECTION, SOLUTION INTRAVENOUS PRN
Status: DISCONTINUED | OUTPATIENT
Start: 2022-02-08 | End: 2022-02-08 | Stop reason: HOSPADM

## 2022-02-08 RX ORDER — ACETAMINOPHEN 325 MG/1
650 TABLET ORAL EVERY 4 HOURS PRN
Status: DISCONTINUED | OUTPATIENT
Start: 2022-02-08 | End: 2022-02-08 | Stop reason: HOSPADM

## 2022-02-08 RX ORDER — AMLODIPINE BESYLATE 5 MG/1
2.5 TABLET ORAL DAILY
Status: DISCONTINUED | OUTPATIENT
Start: 2022-02-08 | End: 2022-02-08 | Stop reason: HOSPADM

## 2022-02-08 RX ORDER — AMLODIPINE BESYLATE 2.5 MG/1
2.5 TABLET ORAL DAILY
Qty: 90 TABLET | Refills: 1 | Status: SHIPPED | OUTPATIENT
Start: 2022-02-08 | End: 2022-05-11

## 2022-02-08 RX ADMIN — FENTANYL CITRATE 25 MCG: 50 INJECTION, SOLUTION INTRAMUSCULAR; INTRAVENOUS at 12:01

## 2022-02-08 RX ADMIN — MIDAZOLAM 0.5 MG: 1 INJECTION INTRAMUSCULAR; INTRAVENOUS at 11:43

## 2022-02-08 RX ADMIN — FENTANYL CITRATE 25 MCG: 50 INJECTION, SOLUTION INTRAMUSCULAR; INTRAVENOUS at 11:51

## 2022-02-08 RX ADMIN — PROPOFOL 20 MCG/KG/MIN: 10 INJECTION, EMULSION INTRAVENOUS at 09:40

## 2022-02-08 RX ADMIN — PROPOFOL 30 MG: 10 INJECTION, EMULSION INTRAVENOUS at 12:04

## 2022-02-08 RX ADMIN — FENTANYL CITRATE 25 MCG: 50 INJECTION, SOLUTION INTRAMUSCULAR; INTRAVENOUS at 11:19

## 2022-02-08 RX ADMIN — MIDAZOLAM 0.5 MG: 1 INJECTION INTRAMUSCULAR; INTRAVENOUS at 10:53

## 2022-02-08 RX ADMIN — FENTANYL CITRATE 50 MCG: 50 INJECTION, SOLUTION INTRAMUSCULAR; INTRAVENOUS at 10:53

## 2022-02-08 RX ADMIN — FENTANYL CITRATE 25 MCG: 50 INJECTION, SOLUTION INTRAMUSCULAR; INTRAVENOUS at 11:42

## 2022-02-08 RX ADMIN — FENTANYL CITRATE 25 MCG: 50 INJECTION, SOLUTION INTRAMUSCULAR; INTRAVENOUS at 10:06

## 2022-02-08 RX ADMIN — FENTANYL CITRATE 25 MCG: 50 INJECTION, SOLUTION INTRAMUSCULAR; INTRAVENOUS at 09:58

## 2022-02-08 RX ADMIN — AMLODIPINE BESYLATE 2.5 MG: 5 TABLET ORAL at 13:22

## 2022-02-08 RX ADMIN — SODIUM CHLORIDE: 9 INJECTION, SOLUTION INTRAVENOUS at 07:54

## 2022-02-08 RX ADMIN — SODIUM CHLORIDE: 9 INJECTION, SOLUTION INTRAVENOUS at 08:05

## 2022-02-08 RX ADMIN — MIDAZOLAM 0.5 MG: 1 INJECTION INTRAMUSCULAR; INTRAVENOUS at 09:41

## 2022-02-08 RX ADMIN — MIDAZOLAM 0.5 MG: 1 INJECTION INTRAMUSCULAR; INTRAVENOUS at 10:43

## 2022-02-08 ASSESSMENT — PULMONARY FUNCTION TESTS
PIF_VALUE: 1
PIF_VALUE: 0
PIF_VALUE: 1
PIF_VALUE: 0
PIF_VALUE: 1
PIF_VALUE: 0
PIF_VALUE: 1
PIF_VALUE: 0
PIF_VALUE: 1
PIF_VALUE: 0
PIF_VALUE: 1
PIF_VALUE: 2
PIF_VALUE: 1
PIF_VALUE: 0
PIF_VALUE: 1
PIF_VALUE: 0
PIF_VALUE: 1

## 2022-02-08 ASSESSMENT — LIFESTYLE VARIABLES: SMOKING_STATUS: 0

## 2022-02-08 NOTE — ANESTHESIA POSTPROCEDURE EVALUATION
Department of Anesthesiology  Postprocedure Note    Patient: Ronen Vaughan  MRN: [id number]  YOB: 1945  Date of evaluation: 2/8/2022  Time:  2:54 PM     Procedure Summary     Date: 02/08/22 Room / Location: [facility] CATH LAB    Anesthesia Start: 7999 Anesthesia Stop: 2244    Procedure: ABLATION WITH ANESTHESIA Diagnosis:       Supraventricular tachycardia      S/P catheter ablation of slow pathway    Scheduled Providers: AL Fritz - CRNA; Yonas Gonzales MD Responsible Provider: Yonas Gonzales MD    Anesthesia Type: MAC ASA Status: 3          Anesthesia Type: MAC    Moon Phase I:      Moon Phase II:      Last vitals: Reviewed and per EMR flowsheets.        Anesthesia Post Evaluation    Patient location during evaluation: PACU  Patient participation: complete - patient participated  Level of consciousness: awake  Pain score: 0  Airway patency: patent  Nausea & Vomiting: no nausea  Complications: no  Cardiovascular status: hemodynamically stable  Respiratory status: acceptable  Hydration status: stable

## 2022-02-08 NOTE — PROGRESS NOTES
Extended Stay Recovery Discharge Documentation    Final procedure site check completed, stable for discharge- Yes  Ambulated without issue post recovery completion, gait steady. Peripheral IV sites removed (see IV Flowsheet) and site asymptomatic- Yes  Patient dressed self without assistance. Discharge instructions reviewed with Provider and verbalized understanding.    Patient discharged Home with friend at 345PM  Monitor cleaned and placed back in nurses' station- Yes

## 2022-02-08 NOTE — PLAN OF CARE
Applied [employer]io AT monitor for 14 days. Serial number [id number]. Instructed patient to avoid showering in the first 24 hours. Press the button on the monitor when you feel a symptom and write it in your diary. Do not submerge in water. No lotion or power near the patch. Please return the monitor in the box provided. Patient verbalized understanding. Morgan notified to register the device.

## 2022-02-09 LAB
EKG ATRIAL RATE: 49 BPM
EKG P AXIS: 107 DEGREES
EKG P-R INTERVAL: 162 MS
EKG Q-T INTERVAL: 448 MS
EKG QRS DURATION: 146 MS
EKG QTC CALCULATION (BAZETT): 404 MS
EKG R AXIS: 117 DEGREES
EKG T AXIS: 127 DEGREES
EKG VENTRICULAR RATE: 49 BPM

## 2022-02-09 NOTE — OP NOTE
Operative Note      Patient: Nia Orta  YOB: 1945  MRN: [id number]    Date of Procedure: 2/8/2022    Pre-Op Diagnosis: SVT    Post-Op Diagnosis: O-AVRT with right posteroseptal accessory pathway, typical AVNRT       Procedure: O-AVRT ablation (62600), 3D mapping (59692), LA pacing (47301), typical AVNRT ablation (23309)    Surgeon: Karl Irizarry DO    Assistant: Dr Abdifatah Alonzo    Anesthesia: monitored anesthesia care, see separate Anesthesia note    Estimated Blood Loss (mL): 20 mL    Complications: none    Detailed Description of Procedure:   Verbal and written informed consent obtained from the patient and placed in the chart. The patient was brought to the EP lab in a fasting state with sinus rhythm as the presenting rhythm. The defibrillator pads were attached to the patient. Monitored anesthesia care (MAC) was administered throughout the procedure per Anesthesia providers. Minimal sedation was desired, but titrated to patients need to ensure both patient safety and patient cooperation in order to maximize the chance for a successful study. After the initial EP study, sedation was deepened for ablation if pursued. The right and left groin was prepared with chlorhexidine gluconate and draped in a sterile fashion. Ultrasound was used to guide central venous access in order to reduce vascular access complications and time to gain access. Lidocaine 2% was applied to planned access sites. Using a modified Seldinger technique, a 6 Fr sheath and an 8 Fr locking sheath were placed in the right femoral vein, as well as a 6 Fr sheath and 7 Fr sheath were placed in the left femoral vein. The 8 Fr sheath was used to introduce the 7 Fr [facility] curve catheter, which was then advanced into the right atrium (RA) and positioned in the coronary sinus.  The 6 Fr sheath in the right femoral vein was used to introduce the introduce a deflectable His catheter, which was advanced into the RA beats, then extra stimulation (S2) starting at 380 msec with reduction in S2 by 10 msec until retrograde block, then continued to reduce S2 by 10 msec until loss of ventricular capture. ? Retrograde AVNERP: 400 msec (S1) - 290 msec (S2)  ? Retrograde APERP: 400 msec (S1) - 310 msec (S2)  ? VERP: 600 msec (S1) - 220 msec (S2)    Atrial activation during VES had earliest signal in proximal CS, so differential RV pacing was performed to differentiate posteroseptal AP from posterior exit site of AV node. Pacing from RVA performed and revealed earliest atrial activation at proximal CS and stim-A interval measured. The 6 Fr Garcia Hexapolar IVC Electrode A [employer] catheter was moved from the RVA to the RV base. RV basal pacing performed and revealed earliest A signal at proximal CS and stim-A measured. The stim-A time from RV basal pacing was shorter than RVA, consistent with posteroseptal AP. Atrial extrastimulation (AES) protocol with pacing from catheter positioned in the CS was performed and induced tachycardia with TCL of ~420 msec. Tachycardia was induced and features included:  ? Tachycardia cycle length (TCL): 420 msec  ? AV relationship: 1:1  ? QRS duration: 150 msec  ? Activation pattern: concentric  ?  Earliest atrial activation site: CS 9,10    As tachycardia had a 1:1 AV relationship, changes in HH interval and AA intervals were assessed and consistent with SVT involving AVN in circuit (AVNRT and AVRT) as changes in HH interval preceded and predicted changes in AA interval. Ventricular overdrive pacing was performed at 10 to 30 msec faster than the TCL while monitoring QRS morphology and continued for 5-10 beats after QRS morphology stabilized in order to ensure atrial advancement, then the response to overdrive pacing was assessed in order to differentiate atrial tachycardia (AT) (VAAV response) from atrioventricular reentrant tachycardia (AVRT) (VA(H)V response with atrial advancement during QRS transition zone) and atrioventricular jeni reentrant tachycardia (AVNRT) (VA(H)V response with atrial advancement after QRS transition zone). ? Ventricular overdrive pacing response: VA(H)V  ? If VA(H)V response, atrial signal first advanced: during QRS transition zone    Paced premature ventricular beats (PVBs) within 50 msec before or after His signal (aka His refractory PVBs) were delivered, then response was assessed in order to differentiate AVNRT from AVRT. HRPVB terminates the tachycardia without reaching the atrium, proving AP involvement in tachycardia (aka AVRT). As patient had VA(H)V response, the septal VA time was measured from surface QRS to earliest A signal on His catheter in order to differentiate typical AVNRT (< 70 msec) from AVRT or atypical AVNRT (> 70 msec). ? Septal VA time: > 70 msec    As patients septal VA time was > 70 msec, PPI - TCL was measured from last advanced atrial signal on RV apex catheter to first return signal in order to differentiate AVRT (< 115 msec) from atypical AVNRT or AVRT with lateral or decremental AP (> 115 msec). ? PPI - TCL: < 115 msec    His catheter was removed and the 6F sheath as exchanged for SLO over the wire, which was then advanced into the right atrium. The wire and dilator were exchanged for 8F THERMOCOOL [facility] Irrigated Ablation Catheter DF curve (Catalog #: [id number]), which was then advanced into LV. The RV catheter was positioned in the RV base and ventricular pacing performed at 600 msec in order to facilitate localization of AP. The site of earliest A activation was mapped in the CS with DecaNav catheter and in the RA with ablation catheter. The site with earliest retrograde A activation was determined at the right posteroseptal location based on Carto data and EGMs after bracketing the area. The ablation catheter was positioned at this site with A and V signal noted on distal electrode.  The position was fine tuned in order to position at site with accessory pathway (AP) potential. Ablation with 20 W for ~60 seconds was delivered, At 6 seconds into ablation, the VA time increased significantly, consistent with loss of retrograde conduction via AP. Following ablation, AES was continued, which revealed concentric, decremental antegrade conduction with AVNERP at 600 msec (S1) - 330 msec (S2) and antegrade Wenckebach at 410 msec. SVT was not induced. Isoproterenol infusion initiated at 2 mcg/min. AVNERP was 500 msec (S1) - < 200 msec (S2). SVT induced with 1:1 AV relationship and VA time of < 70 msec. Ventricular overdrive pacing, revealed VA(H)V response. [address] did not advance the A or terminate the tachycardia. These findings were consistent with typical AVNRT. The tachycardia was terminated with pacing, but recurred and became incessant. Isoproterenol was turned off. The ablation catheter was positioned at the His and a His cloud was marked on the 3D electro-anatomic map. The catheter was then deflected posterior and inferiorly to the typical area of slow pathway potential, which is anterior to the ostium of the CS, at the base of the triangle of Flores. Ablation with 21 W for 30-60 seconds was delivered, which, terminated the SVT. Antegrade AV conduction remained intact. The tachycardia recurred. Additional lesions were delivered, which elicited slow junctional beats. The tachycardia was no longer occurring spotaneously. Attempts to re-induce with AES at multiple drive cycles and up to triple extrastimulation, as well atrial burst pacing were performed, but unable to induce SVT. These maneuvers were repeated with isoproterenol. No tachycardia could be induced. Isoproterenol turned off. Pacing maneuvers revealed decremental, concentric antegrade and retrograde conduction. Catheters and sheaths removed. Hemostasis was achieved with manual pressure and Abbott Perclose Proglide device at each access site.     SUMMARY: Successful

## 2022-02-10 ENCOUNTER — TELEPHONE (OUTPATIENT)
Dept: CARDIAC CATH/INVASIVE PROCEDURES | Age: 77
End: 2022-02-10

## 2022-02-10 NOTE — TELEPHONE ENCOUNTER
I called Aurora Crum to see how he is doing since his Ablation on 2/8/22. He states he's doing well and denies chest pain, SOB, palpitations, bleeding, drainage, or swelling from bilat groin incisions. I reminded him of his follow up appt w/ Dr. Chandni Rangel on 3/15/22 at 3:00 pm.  We reviewed the use of the zio patch and when to push his button and how the journal entries work. I gave him my number ([phone]) if he has any questions. He offers no complaints & was very thankful for the call.

## 2022-03-10 DIAGNOSIS — I47.1 SVT (SUPRAVENTRICULAR TACHYCARDIA) (HCC): ICD-10-CM

## 2022-03-14 NOTE — PROGRESS NOTES
[address] [place] [address] DEPARTMENT/DIVISION OF CARDIOLOGY  Outpatient Consultation Report  PATIENT: Saroj Carlos  MEDICAL RECORD NUMBER: <[id number]>  DATE OF SERVICE:  3/14/2022  ATTENDING ELECTROPHYSIOLOGIST:  Aline Pierre DO  REFERRING PHYSICIAN: No ref. provider found and Vivian Scott MD  CHIEF COMPLAINT: WPW    Subjective: Saroj Carlos is a 77 y.o. male with a history of WPW, WCT, HTN, GERD, and vitamin D deficiency. He is managed by Dr Kvng Quan with Norvasc 2.5 mg daily. In ~2005, he reports episodes of palpitations began and was reportedly diagnosed with WPW syndrome and declined EP study and ablation. In 10/2021, he presented to [facility] with a persistent episode of symptoms. He was diagnosed with WCT that was unresponsive to Vagal maneuvers. A GEORGE/DCCV restored SR. He was started on flecainide 50 mg BID and apixaban 5 mg BID by Cardiology. In January 2022, he was referred to my office. 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 Following ablation of accessory pathway, patient was diagnosed with typical AVNRT which was treated with slow pathway modification. An event monitor after ablation reported sinus rhythm with IVCD and 28 nonsustained episodes of SVT (longest 20.1 seconds at 132 bpm which was asymptomatic). He presents today, 3/15/2022; for follow-up. He denies any complaints at this time. Prior cardiac testing:  · 14 day event monitor (2/22/22): SR with IVCD at 33 - 125 bpm (mean: 54 bpm), patient events during SVT, SR, SVE, and VE; SVE burden ~5.3%, 28 episodes of SVT (longest: 20.1 sec at 132 bpm (asymptomatic); fastest: 139 bpm for 5 beats (asymptomatic)), VE burden = 1.6%, 17 beat episode of AIVR, no AF, VT, advanced AV block, or pauses of 3 or more seconds.    · ECG (1/18/22): SR at 53 bpm, RBBB  · ECG (10/15/21): SR at 59 bpm, WPW (AP localized to left posterior septum). · ECG (10/15/21): SVT @ 139 bpm, long RP  · GEORGE (10/15/21): LVEF = 50-55%, no LA/VALENCIA thrombus, moderate MR, mild AI, mild PI. · Exercise Nuc Stress (18): LVEF = 76%, normal perfusion at 82% of APMHR, 10.1 METS (average exercise capacity), PVC during exercise and recovery, and persistent pre-excitation throughout study. Past Medical History:   Diagnosis Date    Cancer [facility])     skin areas benign    Wide-complex tachycardia ([address]      Past Surgical History:   Procedure Laterality Date    ABLATION OF DYSRHYTHMIC FOCUS  2022    Dr. Kaylen Scott History   Problem Relation Age of Onset   Jan Obrien Other Mother         old age   Jan Obrien Heart Attack Father 61    Other Sister         thyroid     There is no family history of sudden cardiac arrest    Social History     Tobacco Use    Smoking status: Former Smoker     Types: Cigarettes     Quit date: 1967     Years since quittin.2    Smokeless tobacco: Never Used   Substance Use Topics    Alcohol use: Yes     Alcohol/week: 2.0 standard drinks     Types: 2 Cans of beer per week     Comment: weekly        Current Outpatient Medications   Medication Sig Dispense Refill    amLODIPine (NORVASC) 2.5 MG tablet Take 1 tablet by mouth daily 90 tablet 1    Multiple Vitamin (MULTI VITAMIN DAILY PO) Take by mouth       No current facility-administered medications for this visit. Allergies   Allergen Reactions    Hydrochlorothiazide      Other reaction(s): Constipation     ROS:   Constitutional: Negative for fever, activity change and appetite change. HENT: Negative for epistaxis. Eyes: Negative for diploplia, blurred vision. Respiratory: Negative for cough, chest tightness, shortness of breath and wheezing. Cardiovascular: pertinent positives in HPI  Gastrointestinal: Negative for abdominal pain and blood in stool.    Genitourinary: Negative for hematuria and difficulty urinating. Musculoskeletal: Negative for myalgias and gait problem. Skin: Negative for color change and rash. Neurological: Negative for syncope and light-headedness. Psychiatric/Behavioral: Negative for confusion and agitation. The patient is not nervous/anxious. Heme: no bleeding disorders, no melena or hematochezia  All other review of systems are negative     PHYSICAL EXAM:  VS: /74 (Site: Left Upper Arm, Position: Sitting, Cuff Size: Medium Adult)   Pulse (!) 46   Resp 18   Ht 5' 7\" (1.702 m)   Wt 130 lb 12.8 oz (59.3 kg)   BMI 20.49 kg/m²     Constitutional: Well-developed, no acute distress, well groomed  Eyes: conjunctivae normal, no xanthelasma   Ears, Nose, Throat: oral mucosa moist, no cyanosis   Neck: supple, no JVD, no bruits, no thyromegaly   CV: normal rate, regular rhythm,  no murmurs, rubs, or gallops. PMI is nondisplaced, Peripheral pulses normal including carotid auscultation, no noted aortic bruit, bilateral femoral and pedal pulses are normal in quality  Lungs: clear to auscultation bilaterally, normal respiratory effort without used of accessory muscles, no wheezes  Abdomen: soft, non-tender, bowel sounds present, no masses or hepatomegaly   Extremities: no digital clubbing, no edema   Skin: warm, no rashes   Neuro/Psych: A&O x 3, normal mood and affect    Cardiac testing today:  · ECG (3/15/2022): Sinus at 46 bpm, RBBB QRS duration =146 ms. Assessment/Plan:  1. SVT   -Ablation performed in February 2022 with discontinuation of flecainide.  -Asymptomatic and nonsustained following ablation in February 2022.  -Continue to monitor.  -Follow-up with my office in 1 year. 2. O-AVRT sp RFA right posterior septal AP (2/8/2022)    3.   AVNRT typical sp slow pathway modification RFA (2/8/2022)    I spent a total of 30 minutes reviewing previous notes, test results, and face to face with the patient discussing the diagnosis and importance of compliance with the treatment plan as well as documenting on the day of the visit. Time of the day of service includes:  · Preparing to see the patient (eg. Review of the medical record, such as tests). · Obtaining and/or reviewing separately obtained history. · Ordering prescription medications, tests, and/or procedures. · Communicating results to the patient/family/caregiver. · Counseling/educating the patient/family/caregiver. · Documenting clinical information in the patients electronic record. · Coordination of care for the patient. · Performing a medical appropriate exam and/or evaluation. Thank you for allowing me to participate in your patient's care. Please call me if there are any questions. Haven Parra D.O.   Cardiac Electrophysiology  [facility]  [facility]) Physicians    CC: Dr Renea Claros, Dr Hannah Arellano

## 2022-03-15 ENCOUNTER — OFFICE VISIT (OUTPATIENT)
Dept: NON INVASIVE DIAGNOSTICS | Age: 77
End: 2022-03-15
Payer: MEDICARE

## 2022-03-15 VITALS
BODY MASS INDEX: 20.53 KG/M2 | DIASTOLIC BLOOD PRESSURE: 74 MMHG | WEIGHT: 130.8 LBS | HEIGHT: 67 IN | RESPIRATION RATE: 18 BRPM | HEART RATE: 46 BPM | SYSTOLIC BLOOD PRESSURE: 128 MMHG

## 2022-03-15 DIAGNOSIS — I47.1 SVT (SUPRAVENTRICULAR TACHYCARDIA) (HCC): Primary | ICD-10-CM

## 2022-03-15 PROCEDURE — 3017F COLORECTAL CA SCREEN DOC REV: CPT | Performed by: STUDENT IN AN ORGANIZED HEALTH CARE EDUCATION/TRAINING PROGRAM

## 2022-03-15 PROCEDURE — 1036F TOBACCO NON-USER: CPT | Performed by: STUDENT IN AN ORGANIZED HEALTH CARE EDUCATION/TRAINING PROGRAM

## 2022-03-15 PROCEDURE — G8427 DOCREV CUR MEDS BY ELIG CLIN: HCPCS | Performed by: STUDENT IN AN ORGANIZED HEALTH CARE EDUCATION/TRAINING PROGRAM

## 2022-03-15 PROCEDURE — 1123F ACP DISCUSS/DSCN MKR DOCD: CPT | Performed by: STUDENT IN AN ORGANIZED HEALTH CARE EDUCATION/TRAINING PROGRAM

## 2022-03-15 PROCEDURE — 4040F PNEUMOC VAC/ADMIN/RCVD: CPT | Performed by: STUDENT IN AN ORGANIZED HEALTH CARE EDUCATION/TRAINING PROGRAM

## 2022-03-15 PROCEDURE — G8482 FLU IMMUNIZE ORDER/ADMIN: HCPCS | Performed by: STUDENT IN AN ORGANIZED HEALTH CARE EDUCATION/TRAINING PROGRAM

## 2022-03-15 PROCEDURE — G8420 CALC BMI NORM PARAMETERS: HCPCS | Performed by: STUDENT IN AN ORGANIZED HEALTH CARE EDUCATION/TRAINING PROGRAM

## 2022-03-15 PROCEDURE — 99214 OFFICE O/P EST MOD 30 MIN: CPT | Performed by: STUDENT IN AN ORGANIZED HEALTH CARE EDUCATION/TRAINING PROGRAM

## 2022-03-15 PROCEDURE — 93000 ELECTROCARDIOGRAM COMPLETE: CPT | Performed by: STUDENT IN AN ORGANIZED HEALTH CARE EDUCATION/TRAINING PROGRAM

## 2022-05-11 ENCOUNTER — OFFICE VISIT (OUTPATIENT)
Dept: CARDIOLOGY CLINIC | Age: 77
End: 2022-05-11
Payer: MEDICARE

## 2022-05-11 VITALS
BODY MASS INDEX: 20.4 KG/M2 | RESPIRATION RATE: 12 BRPM | HEIGHT: 67 IN | HEART RATE: 51 BPM | SYSTOLIC BLOOD PRESSURE: 142 MMHG | WEIGHT: 130 LBS | DIASTOLIC BLOOD PRESSURE: 76 MMHG

## 2022-05-11 DIAGNOSIS — R00.0 WIDE-COMPLEX TACHYCARDIA: ICD-10-CM

## 2022-05-11 DIAGNOSIS — I10 ESSENTIAL HYPERTENSION: ICD-10-CM

## 2022-05-11 DIAGNOSIS — E78.49 OTHER HYPERLIPIDEMIA: ICD-10-CM

## 2022-05-11 DIAGNOSIS — Z86.79 HISTORY OF WOLFF-PARKINSON-WHITE (WPW) SYNDROME: ICD-10-CM

## 2022-05-11 DIAGNOSIS — I47.1 SVT (SUPRAVENTRICULAR TACHYCARDIA) (HCC): Primary | ICD-10-CM

## 2022-05-11 PROCEDURE — 99214 OFFICE O/P EST MOD 30 MIN: CPT | Performed by: INTERNAL MEDICINE

## 2022-05-11 PROCEDURE — G8420 CALC BMI NORM PARAMETERS: HCPCS | Performed by: INTERNAL MEDICINE

## 2022-05-11 PROCEDURE — 93000 ELECTROCARDIOGRAM COMPLETE: CPT | Performed by: INTERNAL MEDICINE

## 2022-05-11 PROCEDURE — 4040F PNEUMOC VAC/ADMIN/RCVD: CPT | Performed by: INTERNAL MEDICINE

## 2022-05-11 PROCEDURE — 3017F COLORECTAL CA SCREEN DOC REV: CPT | Performed by: INTERNAL MEDICINE

## 2022-05-11 PROCEDURE — 1123F ACP DISCUSS/DSCN MKR DOCD: CPT | Performed by: INTERNAL MEDICINE

## 2022-05-11 PROCEDURE — 1036F TOBACCO NON-USER: CPT | Performed by: INTERNAL MEDICINE

## 2022-05-11 PROCEDURE — G8427 DOCREV CUR MEDS BY ELIG CLIN: HCPCS | Performed by: INTERNAL MEDICINE

## 2022-05-11 NOTE — PATIENT INSTRUCTIONS
 Continue all your medications at current doses.  Please check blood work (Lipid panel)   I will give you a handout for healthy diet   Call me in a week with a BP log.  Restrict sodium intake to less than 2-2.5 g/day. Restrict fluid intake to less than 2.2 L/day. Goal BP is less than 130/80.  Please try to exercise for 150 minutes a week.  I will see you back in the office in 12 months. Please call the office at ([phone], option [phone]) if you have any questions.

## 2022-05-11 NOTE — PROGRESS NOTES
CHIEF COMPLAINT:   Chief Complaint   Patient presents with    6 Month Follow-Up    Tachycardia        HISTORY OF PRESENT ILLNESS: Patient is a 77 y.o. male who is here for a follow up visit with me. He has a history of Taye-Parkinson-White syndrome, supraventricular tachycardia with orthodromic AVRT as well as typical AVNRT. He was admitted to the hospital on 10/15/2021. He was diagnosed with WPW about 20 years ago at [place] recommended ablation but declined at that time.      He saw [facility] approximately 4 years ago since he wasn't covered by insurance never followed up--> ablation was recommended at that time.     He presented with palpitations and was found to have wide-complex regular tachycardia.     Was in [facility]. Hemodynamically stable. Taken for a GEORGE guided cardioversion. Procedure summarized below. He was discharged the following day. He was started on flecainide as well as Eliquis since it was not easy to distinguish between flutter and SVT. He had a follow-up with electrophysiology in January of this year. He underwent an EP study on 2/8/2022. This is summarized below. Essentially, he underwent successful ablation for both orthodromic AVRT as well as typical AVNRT. Since then, he has not had any recurrence of symptoms of palpitations. His Eliquis has been discontinued. Flecainide has been discontinued as well. At today's office visit, He  denies any chest pain, shortness of breath, palpitations, dizziness, pedal edema. The patient is capable of activities of daily living. There is dyspnea on more than moderate exertion. There is no orthopnea or PND. He is compliant with medications as well as diet and exercise regimen. Prior Cardiac workup:  EP study on 2/8/2022: Ablation of orthodromic AVRT as well as slow pathway modification for typical AVNRT-successful.   · 14 day event monitor (2/22/22): SR with IVCD at 33 - 125 bpm (mean: 54 bpm), patient events during SVT, SR, SVE, and VE; SVE burden ~5.3%, 28 episodes of SVT (longest: 20.1 sec at 132 bpm (asymptomatic); fastest: 139 bpm for 5 beats (asymptomatic)), VE burden = 1.6%, 17 beat episode of AIVR, no AF, VT, advanced AV block, or pauses of 3 or more seconds. GEORGE on 10/15/2021: EF is 50 to 55%. Normal RV function. Emptying velocity of left atrial appendage is 42 cm/s. Moderate mitral regurgitation. Mild to moderate tricuspid regurgitation. Status post successful cardioversion with 150 J on 10/15/2021. Past Medical History:   Diagnosis Date    Cancer [facility])     skin areas benign    Wide-complex tachycardia (HCC)        Allergies   Allergen Reactions    Hydrochlorothiazide      Other reaction(s): Constipation       Current Outpatient Medications   Medication Sig Dispense Refill    amLODIPine (NORVASC) 2.5 MG tablet Take 1 tablet by mouth daily 90 tablet 1    Multiple Vitamin (MULTI VITAMIN DAILY PO) Take by mouth       No current facility-administered medications for this visit. Social History     Socioeconomic History    Marital status:      Spouse name: Not on file    Number of children: Not on file    Years of education: Not on file    Highest education level: Not on file   Occupational History    Not on file   Tobacco Use    Smoking status: Former Smoker     Types: Cigarettes     Quit date: 1967     Years since quittin.4    Smokeless tobacco: Never Used   Vaping Use    Vaping Use: Never used   Substance and Sexual Activity    Alcohol use:  Yes     Alcohol/week: 2.0 standard drinks     Types: 2 Cans of beer per week     Comment: weekly     Drug use: Never    Sexual activity: Not on file   Other Topics Concern    Not on file   Social History Narrative    Not on file     Social Determinants of Health     Financial Resource Strain:     Difficulty of Paying Living Expenses: Not on file   Food Insecurity:     Worried About Running Out of Food in the Last Year: Not on file    Clay of Food in the Last Year: Not on file   Transportation Needs:     Lack of Transportation (Medical): Not on file    Lack of Transportation (Non-Medical): Not on file   Physical Activity:     Days of Exercise per Week: Not on file    Minutes of Exercise per Session: Not on file   Stress:     Feeling of Stress : Not on file   Social Connections:     Frequency of Communication with Friends and Family: Not on file    Frequency of Social Gatherings with Friends and Family: Not on file    Attends Druze Services: Not on file    Active Member of [address] [place] [address] [employer] or Organizations: Not on file    Attends Club or Organization Meetings: Not on file    Marital Status: Not on file   Intimate Partner Violence:     Fear of Current or Ex-Partner: Not on file    Emotionally Abused: Not on file    Physically Abused: Not on file    Sexually Abused: Not on file   Housing Stability:     Unable to Pay for Housing in the Last Year: Not on file    Number of [place] in the Last Year: Not on file    Unstable Housing in the Last Year: Not on file       Family History   Problem Relation Age of Onset    Other Mother         old age   Mota Heart Attack Father 61    Other Sister         thyroid       Review of Systems  Constitutional: Negative for fever, malaise/fatigue and weight loss. HENT: Negative for sore throat and tinnitus. Eyes: Negative for blurred vision and double vision. Respiratory: Negative for shortness of breath. Negative for cough and wheezing. Cardiovascular: As mentioned in HPI. Gastrointestinal: Negative for abdominal pain, heartburn, nausea and vomiting. Genitourinary: Negative. Musculoskeletal: Negative for back pain, joint pain and myalgias. Neurological: Negative for dizziness, tremors, loss of consciousness and headaches. Endo/Heme/Allergies: Negative. Psychiatric/Behavioral: Negative for depression and suicidal ideas.     Physical Exam   BP (!) 142/76   Pulse 51   Resp 12   Ht 5' 7\" (1.702 m)   Wt 130 lb (59 kg)   BMI 20.36 kg/m²   Constitutional: Oriented to person, place, and time. Well-developed and well-nourished. No distress. Head: Normocephalic and atraumatic. Eyes: EOM are normal. Pupils are equal, round, and reactive to light. Neck: Normal range of motion. Neck supple. No hepatojugular reflux and no JVD present. Carotid bruit is not present. No tracheal deviation present. No thyromegaly present. Cardiovascular: Normal rate, regular rhythm, normal heart sounds and intact distal pulses. Exam reveals no gallop and no friction rub. No murmur heard. Pulmonary/Chest: Effort normal and breath sounds normal. No respiratory distress. No wheezes. No rales. No tenderness. Abdominal: Soft. Bowel sounds are normal. No distension and no mass. No tenderness. No rebound and no guarding. Musculoskeletal: Normal range of motion. No edema and no tenderness. Lymphadenopathy:   No cervical adenopathy. Neurological: Alert and oriented to person, place, and time. Skin: Skin is warm and dry. No rash noted. Not diaphoretic. No erythema. Psychiatric: Normal mood and affect. Behavior is normal.     Procedures and Testing  EKG: In the office today and reviewed by me. It is bradycardia. Preexcitation seen. Right bundle branch block. ASSESSMENT:   Diagnosis Orders   1. SVT (supraventricular tachycardia) (HCC)  EKG 12 lead    Lipid Panel   2. Essential hypertension     3. Other hyperlipidemia     4. History of Taye-Parkinson-White (WPW) syndrome     5. Wide-complex tachycardia ([address]           1. History of supraventricular tachycardia with orthodromic AVRT, typical AVNRT, history of Taye-Parkinson-White syndrome: Status post successful EP study and ablation. He is symptomatically better. 14-day Holter monitor from February of this year reviewed. Few episodes of SVT. He is following with electrophysiology. Eliquis and flecainide were discontinued.   2.  Hypertension: He is currently on Norvasc 2.5 mg daily. His pressure is above goal in the office today. He states that better controlled at home. I have asked him to monitor his pressures at home. He does state that his morning pressures are elevated. He will not take his Norvasc at night and call me in a week with a BP log. Salt restriction counseling provided. 3.  Hyperlipidemia: Lipid panel from 10/15/2021: 189/64/68/108. LDL is near goal.  Continue with dietary and lifestyle modifications. I will recheck a lipid panel for stratification. Follow-up with me in the office in 12 months. Leigh Cole MD  [facility]) Cardiology     NOTE: This report was transcribed using voice recognition software. Every effort was made to ensure accuracy; however, inadvertent computerized transcription errors may be present.

## 2022-05-19 ENCOUNTER — TELEPHONE (OUTPATIENT)
Dept: CARDIOLOGY CLINIC | Age: 77
End: 2022-05-19

## 2022-05-19 NOTE — TELEPHONE ENCOUNTER
1 week f/u     Weight 130      B/P     5-12 125/65    50    5-13 124/69    56    5-14 126/67    52    5-15 128/67    55    5-16 135/68    53    5-17 120/64     54      5-19 128/67    60      No c/o chest pain , SOB , palpitations or pedal edema    States he feels well and been going to the [facility] 3x weekly       Plan as follows:    · Continue all your medications at current doses. · Please check blood work (Lipid panel)  · I will give you a handout for healthy diet  · Call me in a week with a BP log. · Restrict sodium intake to less than 2-2.5 g/day. Restrict fluid intake to less than 2.2 L/day. Goal BP is less than 130/80. · Please try to exercise for 150 minutes a week.    I will see you back in the office in 12 months.

## 2022-08-09 RX ORDER — AMLODIPINE BESYLATE 2.5 MG/1
TABLET ORAL
Qty: 90 TABLET | Refills: 0 | OUTPATIENT
Start: 2022-08-09

## 2022-08-15 RX ORDER — AMLODIPINE BESYLATE 2.5 MG/1
TABLET ORAL
Qty: 90 TABLET | Refills: 0 | OUTPATIENT
Start: 2022-08-15

## 2022-10-17 ENCOUNTER — TELEPHONE (OUTPATIENT)
Dept: ADMINISTRATIVE | Age: 77
End: 2022-10-17

## 2022-10-17 NOTE — TELEPHONE ENCOUNTER
Pt calling after receiving a letter to schedule. States on his last OV with RS to return in 1 year May 2023. Advise please. No May scheduled available yet?

## 2022-10-17 NOTE — TELEPHONE ENCOUNTER
Spoke to patient let him know to call in March for a May appointment . If he has any additional questions or concerns office number given.

## 2023-06-09 ENCOUNTER — TELEPHONE (OUTPATIENT)
Dept: NON INVASIVE DIAGNOSTICS | Age: 78
End: 2023-06-09

## 2023-06-14 NOTE — PROGRESS NOTES
[address] ELECTROPHYSIOLOGY DEPARTMENT/DIVISION OF CARDIOLOGY  Outpatient Consultation Report  PATIENT: Edilson Lanza  MEDICAL RECORD NUMBER: <[id number]>  DATE OF SERVICE:  1/18/2022  ATTENDING ELECTROPHYSIOLOGIST:  Eva Quijano DO  REFERRING PHYSICIAN: Ragini Estrada MD and Kelley Todd MD  CHIEF COMPLAINT: WPW    HPI: Edilson Lanza is a 77 y.o. male with a history of WPW, AT/AFL/SVT, HTN, GERD, and vitamin D deficiency. He is managed by Dr Alejandro Yeung with flecainide 50 mg BID and apixaban 5 mg BID. In ~2005, he reports episodes of palpitations began and was reportedly diagnosed with WPW syndrome and declined EP study and ablation. He describes palpitations as fast heart beat, initiated with quick movements or leaning forward at the dinner table, duration of a few minutes, and resolve with deep breathing/relaxation. Episodes occurred once every few weeks to months and typically no other symptoms except two episodes associated with near syncope. He denies any prior syncope. In 10/2021, he presented to [facility] with a persistent episode of symptoms. He was diagnosed with WCT that was unresponsive to Vagal maneuvers. A GEORGE/DCCV restored SR. He was started on flecainide 50 mg BID and apixaban 5 mg BID by Cardiology. He presents today, 1/18/22; for evaluation and management of WPW syndrome. Since that time, he reports significant reduction in symptom severity, duration (a few seconds), and frequency. He denies any other complaints at this time. Prior cardiac testing:  · ECG (10/15/21): SR at 59 bpm, WPW (AP localized to left posterior septum). · ECG (10/15/21): SVT @ 139 bpm, long RP  · GEORGE (10/15/21): LVEF = 50-55%, no LA/VALENCIA thrombus, moderate MR, mild AI, mild PI.   · Exercise Nuc Stress (12/21/18): LVEF = 76%, normal perfusion at 82% of APMHR, 10.1 METS (average exercise capacity), PVC during exercise and recovery, and persistent pre-excitation throughout study. Past Medical History:   Diagnosis Date    Cancer ([facility] [address]     skin areas benign    Wide-complex tachycardia (HCC)      Past Surgical History:   Procedure Laterality Date    TONSILLECTOMY        Family History   Problem Relation Age of Onset    Other Mother         old age   Mota Heart Attack Father 61    Other Sister         thyroid     There is no family history of sudden cardiac arrest    Social History     Tobacco Use    Smoking status: Former Smoker     Types: Cigarettes     Quit date: 1967     Years since quittin.1    Smokeless tobacco: Never Used   Substance Use Topics    Alcohol use: Yes     Alcohol/week: 2.0 standard drinks     Types: 2 Cans of beer per week     Comment: weekly        Current Outpatient Medications   Medication Sig Dispense Refill    Multiple Vitamin (MULTI VITAMIN DAILY PO) Take by mouth      flecainide (TAMBOCOR) 50 MG tablet Take 1 tablet by mouth every 12 hours 60 tablet 3    apixaban (ELIQUIS) 5 MG TABS tablet Take 1 tablet by mouth 2 times daily 120 tablet 1     No current facility-administered medications for this visit. Allergies   Allergen Reactions    Hydrochlorothiazide      Other reaction(s): Constipation     ROS:   Constitutional: Negative for fever, activity change and appetite change. HENT: Negative for epistaxis. Eyes: Negative for diploplia, blurred vision. Respiratory: Negative for cough, chest tightness, shortness of breath and wheezing. Cardiovascular: pertinent positives in HPI  Gastrointestinal: Negative for abdominal pain and blood in stool. Genitourinary: Negative for hematuria and difficulty urinating. Musculoskeletal: Negative for myalgias and gait problem. Skin: Negative for color change and rash. Neurological: Negative for syncope and light-headedness. Psychiatric/Behavioral: Negative for confusion and agitation. The patient is not nervous/anxious.   Heme: no bleeding disorders, no melena or hematochezia  All other review of systems are negative     PHYSICAL EXAM:  VS: BP (!) 140/98 (Site: Left Upper Arm, Position: Sitting, Cuff Size: Medium Adult)   Pulse 53   Resp 18   Ht 5' 7\" (1.702 m)   Wt 130 lb 9.6 oz (59.2 kg)   BMI 20.45 kg/m²    Constitutional: Well-developed, no acute distress, well groomed  Eyes: conjunctivae normal, no xanthelasma   Ears, Nose, Throat: oral mucosa moist, no cyanosis   Neck: supple, no JVD, no bruits, no thyromegaly   CV: normal rate, regular rhythm,  no murmurs, rubs, or gallops. PMI is nondisplaced, Peripheral pulses normal including carotid auscultation, no noted aortic bruit, bilateral femoral and pedal pulses are normal in quality  Lungs: clear to auscultation bilaterally, normal respiratory effort without used of accessory muscles, no wheezes  Abdomen: soft, non-tender, bowel sounds present, no masses or hepatomegaly   Extremities: no digital clubbing, no edema   Skin: warm, no rashes   Neuro/Psych: A&O x 3, normal mood and affect    Cardiac testing today:  · ECG (1/18/22): SR at 53 bpm, RBBB     Assessment/Plan:  1. WPW Syndrome  -Recommend EP study and ablation. I reviewed indications, material risks, benefits, and alternatives. He is agreeable to proceed. -Stop apixaban 24 hours prior to procedure.  -Stop flecainide 5 days prior to procedure.  -Review of ECG localizes AP likely to left posteroseptal region. However, ~13% of patients have multiple accessory pathways. I spent a total of 60 minutes reviewing previous notes, test results, and face to face with the patient discussing the diagnosis and importance of compliance with the treatment plan as well as documenting on the day of the visit. Time of the day of service includes:  · Preparing to see the patient (eg. Review of the medical record, such as tests). · Obtaining and/or reviewing separately obtained history. · Ordering prescription medications, tests, and/or procedures.   · Communicating

## 2025-02-28 ENCOUNTER — APPOINTMENT (OUTPATIENT)
Dept: ULTRASOUND IMAGING | Age: 80
End: 2025-02-28
Payer: MEDICARE

## 2025-02-28 ENCOUNTER — HOSPITAL ENCOUNTER (OUTPATIENT)
Dept: ULTRASOUND IMAGING | Age: 80
Discharge: HOME | End: 2025-02-28
Payer: MEDICARE

## 2025-02-28 DIAGNOSIS — E78.5 HYPERLIPOPROTEINEMIA: ICD-10-CM

## 2025-02-28 PROCEDURE — 93975 VASCULAR STUDY: CPT

## 2025-02-28 PROCEDURE — 76770 US EXAM ABDO BACK WALL COMP: CPT

## 2025-03-05 ENCOUNTER — HOSPITAL ENCOUNTER (OUTPATIENT)
Dept: CT IMAGING | Age: 80
Discharge: HOME | End: 2025-03-07
Payer: MEDICARE

## 2025-03-05 DIAGNOSIS — N28.89 URETERAL FISTULA: ICD-10-CM

## 2025-03-05 DIAGNOSIS — R10.32 ABDOMINAL PAIN, LEFT LOWER QUADRANT: ICD-10-CM

## 2025-03-05 DIAGNOSIS — N40.0 ENLARGED PROSTATE: ICD-10-CM

## 2025-03-05 PROCEDURE — 2500000003 HC RX 250 WO HCPCS: Performed by: RADIOLOGY

## 2025-03-05 PROCEDURE — 6360000004 HC RX CONTRAST MEDICATION: Performed by: RADIOLOGY

## 2025-03-05 PROCEDURE — 74170 CT ABD WO CNTRST FLWD CNTRST: CPT

## 2025-03-05 RX ORDER — SODIUM CHLORIDE 0.9 % (FLUSH) 0.9 %
10 SYRINGE (ML) INJECTION PRN
Status: DISCONTINUED | OUTPATIENT
Start: 2025-03-05 | End: 2025-03-08 | Stop reason: HOSPADM

## 2025-03-05 RX ORDER — IOPAMIDOL 755 MG/ML
75 INJECTION, SOLUTION INTRAVASCULAR
Status: COMPLETED | OUTPATIENT
Start: 2025-03-05 | End: 2025-03-05

## 2025-03-05 RX ADMIN — SODIUM CHLORIDE, PRESERVATIVE FREE 10 ML: 5 INJECTION INTRAVENOUS at 13:24

## 2025-03-05 RX ADMIN — IOPAMIDOL 75 ML: 755 INJECTION, SOLUTION INTRAVENOUS at 13:24

## 2025-08-15 ENCOUNTER — HOSPITAL ENCOUNTER (OUTPATIENT)
Dept: ULTRASOUND IMAGING | Age: 80
Discharge: HOME | End: 2025-08-17
Payer: MEDICARE

## 2025-08-15 DIAGNOSIS — R94.4 NONSPECIFIC ABNORMAL RESULTS OF KIDNEY FUNCTION STUDY: ICD-10-CM

## 2025-08-15 PROCEDURE — 76770 US EXAM ABDO BACK WALL COMP: CPT
